# Patient Record
Sex: MALE | Race: BLACK OR AFRICAN AMERICAN | NOT HISPANIC OR LATINO
[De-identification: names, ages, dates, MRNs, and addresses within clinical notes are randomized per-mention and may not be internally consistent; named-entity substitution may affect disease eponyms.]

---

## 2018-06-19 PROBLEM — Z00.00 ENCOUNTER FOR PREVENTIVE HEALTH EXAMINATION: Status: ACTIVE | Noted: 2018-06-19

## 2018-08-25 ENCOUNTER — APPOINTMENT (OUTPATIENT)
Dept: SLEEP CENTER | Facility: CLINIC | Age: 58
End: 2018-08-25
Payer: MEDICARE

## 2018-08-25 ENCOUNTER — OUTPATIENT (OUTPATIENT)
Dept: OUTPATIENT SERVICES | Facility: HOSPITAL | Age: 58
LOS: 1 days | End: 2018-08-25
Payer: COMMERCIAL

## 2018-08-25 PROCEDURE — 95810 POLYSOM 6/> YRS 4/> PARAM: CPT | Mod: 26

## 2018-08-25 PROCEDURE — 95810 POLYSOM 6/> YRS 4/> PARAM: CPT

## 2018-08-27 DIAGNOSIS — G47.33 OBSTRUCTIVE SLEEP APNEA (ADULT) (PEDIATRIC): ICD-10-CM

## 2018-12-03 ENCOUNTER — APPOINTMENT (OUTPATIENT)
Dept: PULMONOLOGY | Facility: CLINIC | Age: 58
End: 2018-12-03
Payer: MEDICARE

## 2018-12-03 VITALS
SYSTOLIC BLOOD PRESSURE: 126 MMHG | HEIGHT: 72 IN | DIASTOLIC BLOOD PRESSURE: 81 MMHG | HEART RATE: 83 BPM | BODY MASS INDEX: 28.17 KG/M2 | TEMPERATURE: 98.1 F | RESPIRATION RATE: 16 BRPM | WEIGHT: 208 LBS | OXYGEN SATURATION: 96 %

## 2018-12-03 DIAGNOSIS — Z78.9 OTHER SPECIFIED HEALTH STATUS: ICD-10-CM

## 2018-12-03 PROCEDURE — ZZZZZ: CPT

## 2018-12-03 PROCEDURE — 99205 OFFICE O/P NEW HI 60 MIN: CPT | Mod: GC,25

## 2018-12-03 PROCEDURE — 94010 BREATHING CAPACITY TEST: CPT | Mod: GC

## 2018-12-03 PROCEDURE — 94729 DIFFUSING CAPACITY: CPT | Mod: GC

## 2018-12-03 PROCEDURE — 94726 PLETHYSMOGRAPHY LUNG VOLUMES: CPT | Mod: GC

## 2019-10-13 ENCOUNTER — EMERGENCY (EMERGENCY)
Facility: HOSPITAL | Age: 59
LOS: 1 days | Discharge: HOME | End: 2019-10-13
Attending: EMERGENCY MEDICINE
Payer: COMMERCIAL

## 2019-10-13 VITALS
OXYGEN SATURATION: 97 % | DIASTOLIC BLOOD PRESSURE: 78 MMHG | HEIGHT: 72 IN | SYSTOLIC BLOOD PRESSURE: 124 MMHG | WEIGHT: 205.03 LBS | HEART RATE: 90 BPM | TEMPERATURE: 99 F | RESPIRATION RATE: 18 BRPM

## 2019-10-13 VITALS
SYSTOLIC BLOOD PRESSURE: 121 MMHG | DIASTOLIC BLOOD PRESSURE: 85 MMHG | RESPIRATION RATE: 18 BRPM | OXYGEN SATURATION: 97 % | TEMPERATURE: 98 F | HEART RATE: 76 BPM

## 2019-10-13 PROCEDURE — 99283 EMERGENCY DEPT VISIT LOW MDM: CPT

## 2019-10-13 RX ORDER — IBUPROFEN 200 MG
600 TABLET ORAL ONCE
Refills: 0 | Status: COMPLETED | OUTPATIENT
Start: 2019-10-13 | End: 2019-10-13

## 2019-10-13 RX ORDER — LIDOCAINE 4 G/100G
1 CREAM TOPICAL ONCE
Refills: 0 | Status: COMPLETED | OUTPATIENT
Start: 2019-10-13 | End: 2019-10-13

## 2019-10-13 RX ORDER — ACETAMINOPHEN 500 MG
975 TABLET ORAL ONCE
Refills: 0 | Status: COMPLETED | OUTPATIENT
Start: 2019-10-13 | End: 2019-10-13

## 2019-10-13 RX ADMIN — Medication 600 MILLIGRAM(S): at 17:01

## 2019-10-13 RX ADMIN — Medication 975 MILLIGRAM(S): at 17:01

## 2019-10-13 RX ADMIN — LIDOCAINE 1 PATCH: 4 CREAM TOPICAL at 17:02

## 2019-10-13 NOTE — ED PROVIDER NOTE - CLINICAL SUMMARY MEDICAL DECISION MAKING FREE TEXT BOX
5 days of lower back pain, non radiating with no red flags-  no recent injury/trauma.  hx of herniated disc- motrin , tylenol and lidoderm given

## 2019-10-13 NOTE — ED ADULT NURSE NOTE - OBJECTIVE STATEMENT
Patient is a 59 y male presenting to the ED ambulatory from home with c/o back pain. Patient A&Ox4. Patient reports having multiple herniated disks in the back and reports having chronic pain. States the pain worsened on Tuesday in the medial lower back and has not improved since. Patient reports not having pain while lying down and states the pain increases when standing up or ambulating. Denies any numbness/tingling in extremities. Patient reports ambulating into ED with cane. Denies chest pain, SOB, headache, N/V/D, abdominal pain, fever/chills, burning upon urination or difficulty urinating, hematuria. PMH of depression.

## 2019-10-13 NOTE — ED PROVIDER NOTE - NSFOLLOWUPINSTRUCTIONS_ED_ALL_ED_FT
-- Please use 650mg Tylenol (also called acetaminophen) every 4 hours & 600mg Motrin (also called Advil or ibuprofen) every 6 hours as needed for pain/discomfort/swelling. You can get these without a prescription. Don't use more than 3500mg of Tylenol in any 24-hour period. Make sure your other prescription/over-the-counter medications don't contain any Tylenol so you don't take too much. If you have any stomach discomfort while taking Motrin, you can use TUMS or Pepcid or Zantac (these can all be bought without a prescription).   use over the counter lidoderm patch to lower back  once daily- SALONPAS is over the counter. -- Please use 650mg Tylenol (also called acetaminophen) every 4 hours & 600mg Motrin (also called Advil or ibuprofen) every 6 hours as needed for pain/discomfort/swelling. You can get these without a prescription. Don't use more than 3500mg of Tylenol in any 24-hour period. Make sure your other prescription/over-the-counter medications don't contain any Tylenol so you don't take too much. If you have any stomach discomfort while taking Motrin, you can use TUMS or Pepcid or Zantac (these can all be bought without a prescription).   use over the counter lidoderm patch to lower back  once daily- SALONPAS is over the counter.  Follow up with DR. COURTNEY [phone] in the next week  Ice packs to all sore areas for 20 min at a time -- Please use 650mg Tylenol (also called acetaminophen) every 4 hours & 600mg Motrin (also called Advil or ibuprofen) every 6 hours as needed for pain/discomfort/swelling. You can get these without a prescription. Don't use more than 3500mg of Tylenol in any 24-hour period. Make sure your other prescription/over-the-counter medications don't contain any Tylenol so you don't take too much. If you have any stomach discomfort while taking Motrin, you can use TUMS or Pepcid or Zantac (these can all be bought without a prescription).   use over the counter lidoderm patch to lower back  once daily- SALONPAS is over the counter.  Follow up with DR. COURTNEY [phone] in the next week  Ice packs to all sore areas for 20 min at a time    Opthalmology clinic  is [phone]

## 2019-10-13 NOTE — ED PROVIDER NOTE - PATIENT PORTAL LINK FT
You can access the FollowMyHealth Patient Portal offered by [facility] by registering at the following website: [email or web address]. By joining [facility]’s FollowMyHealth portal, you will also be able to view your health information using other applications (apps) compatible with our system.

## 2019-10-13 NOTE — ED PROVIDER NOTE - OBJECTIVE STATEMENT
58 yo male in room 6 Presents to the ER for evaluation of lower back pain since tuesday 5 days ago. Pt states "I started having pain on tuesday and I have a history of herniated disc in my lower spine and every few months it bothers me. I did not have any medications at home to take so I have not had anything for the pain". Pt denies recent injury  or trauma and uses a cane to get around for long walks.  Pt denies numbness/tingling/weakness at this time.  Denies loss of bowel or bladder function. Denies weakness. Denies sciatic pain.  No complaints of urinary symptoms.

## 2019-10-13 NOTE — ED PROVIDER NOTE - CARE PLAN
Principal Discharge DX:	Acute pain of left shoulder Principal Discharge DX:	Chronic midline low back pain without sciatica

## 2019-10-13 NOTE — ED PROVIDER NOTE - ATTENDING CONTRIBUTION TO CARE
60yo male pmh sarcoid, herniated discs, right hip replacement p/w bilateral lower back pain x 5 days. No meds at home. Gradual onset, worse with movement, improved with warm bath, had sudden recurrence 4d ago, now improving, able to ambulate. Denies urinary incontinence, saddle anesthesia, IVDU, fevers, n/v/d, abd pain, dysuria, hematuria, chest pain, cough, dyspnea, recent travel, LE swelling or pain. Negative straight leg raise. No spinal bony tenderness, pt appears well overall. Discussed OTC meds and lido patch, pt agrees with plan and outpatient follow up. Also provided contact info for ophtho clinic for follow up for his ophthalmic sarcoid as pt recently moved. Denies any new vision complaints.

## 2019-10-13 NOTE — ED ADULT TRIAGE NOTE - CHIEF COMPLAINT QUOTE
chronic back pain, history of herniated disc, ambulates with cane.  Pt reports he walked to the ER with cane

## 2020-02-10 ENCOUNTER — APPOINTMENT (OUTPATIENT)
Dept: PULMONOLOGY | Facility: CLINIC | Age: 60
End: 2020-02-10
Payer: MEDICARE

## 2020-02-10 VITALS
HEIGHT: 72 IN | TEMPERATURE: 98.8 F | RESPIRATION RATE: 16 BRPM | BODY MASS INDEX: 28.44 KG/M2 | SYSTOLIC BLOOD PRESSURE: 124 MMHG | HEART RATE: 70 BPM | WEIGHT: 210 LBS | DIASTOLIC BLOOD PRESSURE: 84 MMHG

## 2020-02-10 PROCEDURE — 99214 OFFICE O/P EST MOD 30 MIN: CPT

## 2020-02-10 NOTE — HISTORY OF PRESENT ILLNESS
[FreeTextEntry1] : 60yo M with moderate GARRETT, pulmonary sarcoidosis in remission, here for follow up of sleep apnea given that he needs hip surgery. He has hip arthritis given long term past usage of steroids. He snores, and is sometimes sleepy during the day. He sometimes has sleep fragmentation. He was on CPAP since 2007, but machine is nonfunctional now.

## 2020-02-10 NOTE — REVIEW OF SYSTEMS
[EDS: ESS=____] : daytime somnolence: ESS=[unfilled] [Fatigue] : fatigue [Snoring] : snoring [Witnessed Apneas] : witnessed apnea [A.M. Dry Mouth] : a.m. dry mouth [Arthralgias] : arthralgias [Negative] : Psychiatric [Obesity] : not obese [Chest Pain] : no chest pain [Diabetes] : no diabetes  [Anemia] : no anemia [A.M. Headache] : no headache present upon awakening [Difficulty Initiating Sleep] : no difficulty falling asleep [Lower Extremity Discomfort] : no lower extremity discomfort [Late day/ Evening symptoms] : no late day/evening symptoms [Cataplexy] :  no cataplexy

## 2020-02-10 NOTE — PHYSICAL EXAM
[General Appearance - In No Acute Distress] : no acute distress [Normal Conjunctiva] : the conjunctiva exhibited no abnormalities [III] : III [Neck Appearance] : the appearance of the neck was normal [Heart Rate And Rhythm] : heart rate was normal and rhythm regular [] : no respiratory distress [Heart Sounds] : normal S1 and S2 [Exaggerated Use Of Accessory Muscles For Inspiration] : no accessory muscle use [Respiration, Rhythm And Depth] : normal respiratory rhythm and effort [Auscultation Breath Sounds / Voice Sounds] : lungs were clear to auscultation bilaterally [Involuntary Movements] : no involuntary movements were seen [Non-Pitting] : non-pitting [Skin Color & Pigmentation] : normal skin color and pigmentation [No Focal Deficits] : no focal deficits [Oriented To Time, Place, And Person] : oriented to person, place, and time [Normal Oropharynx] : abnormal oropharynx

## 2020-02-10 NOTE — ASSESSMENT
[FreeTextEntry1] : 60yo M with moderate GARRETT, pulmonary sarcoidosis in remission, here for follow up of sleep apnea given that he needs hip surgery. He has hip arthritis given long term past usage of steroids. He snores, and is sometimes sleepy during the day. He sometimes has sleep fragmentation. He was on CPAP since 2007, but machine is nonfunctional now. \par \par

## 2020-02-12 ENCOUNTER — FORM ENCOUNTER (OUTPATIENT)
Age: 60
End: 2020-02-12

## 2020-02-19 ENCOUNTER — APPOINTMENT (OUTPATIENT)
Dept: PULMONOLOGY | Facility: CLINIC | Age: 60
End: 2020-02-19

## 2020-11-11 ENCOUNTER — APPOINTMENT (OUTPATIENT)
Dept: PULMONOLOGY | Facility: CLINIC | Age: 60
End: 2020-11-11

## 2021-03-08 ENCOUNTER — LABORATORY RESULT (OUTPATIENT)
Age: 61
End: 2021-03-08

## 2021-03-11 ENCOUNTER — APPOINTMENT (OUTPATIENT)
Dept: NEPHROLOGY | Facility: CLINIC | Age: 61
End: 2021-03-11
Payer: MEDICARE

## 2021-03-11 VITALS
OXYGEN SATURATION: 97 % | DIASTOLIC BLOOD PRESSURE: 75 MMHG | HEIGHT: 72 IN | BODY MASS INDEX: 27.47 KG/M2 | HEART RATE: 89 BPM | TEMPERATURE: 97.3 F | WEIGHT: 202.82 LBS | SYSTOLIC BLOOD PRESSURE: 116 MMHG

## 2021-03-11 DIAGNOSIS — E55.9 VITAMIN D DEFICIENCY, UNSPECIFIED: ICD-10-CM

## 2021-03-11 DIAGNOSIS — Z56.0 UNEMPLOYMENT, UNSPECIFIED: ICD-10-CM

## 2021-03-11 DIAGNOSIS — Z82.49 FAMILY HISTORY OF ISCHEMIC HEART DISEASE AND OTHER DISEASES OF THE CIRCULATORY SYSTEM: ICD-10-CM

## 2021-03-11 DIAGNOSIS — Z78.9 OTHER SPECIFIED HEALTH STATUS: ICD-10-CM

## 2021-03-11 DIAGNOSIS — Z86.74 PERSONAL HISTORY OF SUDDEN CARDIAC ARREST: ICD-10-CM

## 2021-03-11 DIAGNOSIS — Z83.2 FAMILY HISTORY OF DISEASES OF THE BLOOD AND BLOOD-FORMING ORGANS AND CERTAIN DISORDERS INVOLVING THE IMMUNE MECHANISM: ICD-10-CM

## 2021-03-11 DIAGNOSIS — Z83.3 FAMILY HISTORY OF DIABETES MELLITUS: ICD-10-CM

## 2021-03-11 DIAGNOSIS — E61.1 IRON DEFICIENCY: ICD-10-CM

## 2021-03-11 PROCEDURE — 99072 ADDL SUPL MATRL&STAF TM PHE: CPT

## 2021-03-11 PROCEDURE — 99214 OFFICE O/P EST MOD 30 MIN: CPT

## 2021-03-11 SDOH — ECONOMIC STABILITY - INCOME SECURITY: UNEMPLOYMENT, UNSPECIFIED: Z56.0

## 2021-03-11 NOTE — PHYSICAL EXAM
[General Appearance - Alert] : alert [General Appearance - In No Acute Distress] : in no acute distress [Respiration, Rhythm And Depth] : normal respiratory rhythm and effort [Exaggerated Use Of Accessory Muscles For Inspiration] : no accessory muscle use [Auscultation Breath Sounds / Voice Sounds] : lungs were clear to auscultation bilaterally [Apical Impulse] : the apical impulse was normal [Heart Sounds] : normal S1 and S2 [Murmurs] : no murmurs [Heart Sounds Pericardial Friction Rub] : no pericardial rub [Edema] : there was no peripheral edema [Bowel Sounds] : normal bowel sounds [Abdomen Soft] : soft [Abdomen Tenderness] : non-tender [] : no hepato-splenomegaly [No CVA Tenderness] : no ~M costovertebral angle tenderness [Dialysis Catheter] : dialysis catheter [Oriented To Time, Place, And Person] : oriented to person, place, and time [Impaired Insight] : insight and judgment were intact [Affect] : the affect was normal

## 2021-03-12 PROBLEM — E61.1 IRON DEFICIENCY: Status: ACTIVE | Noted: 2021-03-12

## 2021-03-12 PROBLEM — E55.9 VITAMIN D INSUFFICIENCY: Status: ACTIVE | Noted: 2021-03-12

## 2021-03-12 LAB
25(OH)D3 SERPL-MCNC: 21.5 NG/ML
ALBUMIN SERPL ELPH-MCNC: 3.6 G/DL
ALBUMIN SERPL ELPH-MCNC: 3.9 G/DL
ALBUMIN SERPL ELPH-MCNC: 4 G/DL
ALP BLD-CCNC: 122 U/L
ALP BLD-CCNC: 128 U/L
ALT SERPL-CCNC: 17 U/L
ALT SERPL-CCNC: 19 U/L
ANION GAP SERPL CALC-SCNC: 10 MMOL/L
ANION GAP SERPL CALC-SCNC: 10 MMOL/L
ANION GAP SERPL CALC-SCNC: 15 MMOL/L
APPEARANCE: CLEAR
AST SERPL-CCNC: 21 U/L
AST SERPL-CCNC: 23 U/L
BACTERIA: NEGATIVE
BASOPHILS # BLD AUTO: 0.05 K/UL
BASOPHILS NFR BLD AUTO: 0.5 %
BILIRUB SERPL-MCNC: 1.5 MG/DL
BILIRUB SERPL-MCNC: 1.6 MG/DL
BILIRUBIN URINE: NEGATIVE
BLOOD URINE: NEGATIVE
BSA DERIVED: 1.73 M2
BUN SERPL-MCNC: 21 MG/DL
BUN SERPL-MCNC: 27 MG/DL
BUN SERPL-MCNC: 29 MG/DL
CALCIUM SERPL-MCNC: 8.6 MG/DL
CALCIUM SERPL-MCNC: 9.3 MG/DL
CALCIUM SERPL-MCNC: 9.4 MG/DL
CALCIUM SERPL-MCNC: 9.4 MG/DL
CHLORIDE SERPL-SCNC: 107 MMOL/L
CHLORIDE SERPL-SCNC: 107 MMOL/L
CHLORIDE SERPL-SCNC: 108 MMOL/L
CO2 SERPL-SCNC: 20 MMOL/L
CO2 SERPL-SCNC: 23 MMOL/L
CO2 SERPL-SCNC: 23 MMOL/L
COLOR: YELLOW
CREAT 24H UR-MCNC: 1.3 G/24 H
CREAT 24H UR-MCNC: 1.3 G/24 H
CREAT 24H UR-MCNC: 2 G/24 H
CREAT 24H UR-MCNC: 2.1 G/24 H
CREAT 24H UR-MCNC: 2.1 G/24 H
CREAT ?TM UR-MCNC: 81 MG/DL
CREAT ?TM UR-MCNC: 83 MG/DL
CREAT ?TM UR-MCNC: 83 MG/DL
CREAT ?TM UR-MCNC: 92 MG/DL
CREAT ?TM UR-MCNC: 92 MG/DL
CREAT CL 24H UR+SERPL-VRATE: 59 ML/MIN
CREAT SERPL-MCNC: 1.76 MG/DL
CREAT SERPL-MCNC: 2.11 MG/DL
CREAT SERPL-MCNC: 2.35 MG/DL
CREAT SPEC-SCNC: 100 MG/DL
CREAT/PROT UR: 0.2 RATIO
EOSINOPHIL # BLD AUTO: 0.19 K/UL
EOSINOPHIL NFR BLD AUTO: 2.1 %
FERRITIN SERPL-MCNC: 300 NG/ML
GLUCOSE QUALITATIVE U: NEGATIVE
GLUCOSE SERPL-MCNC: 100 MG/DL
GLUCOSE SERPL-MCNC: 83 MG/DL
GLUCOSE SERPL-MCNC: 94 MG/DL
HCT VFR BLD CALC: 32.6 %
HGB BLD-MCNC: 10.9 G/DL
HYALINE CASTS: 0 /LPF
IMM GRANULOCYTES NFR BLD AUTO: 0.2 %
IRON SATN MFR SERPL: 14 %
IRON SERPL-MCNC: 35 UG/DL
KETONES URINE: NEGATIVE
LEUKOCYTE ESTERASE URINE: ABNORMAL
LYMPHOCYTES # BLD AUTO: 3.67 K/UL
LYMPHOCYTES NFR BLD AUTO: 40.1 %
MAGNESIUM SERPL-MCNC: 1.9 MG/DL
MAN DIFF?: NORMAL
MCHC RBC-ENTMCNC: 28.3 PG
MCHC RBC-ENTMCNC: 33.4 GM/DL
MCV RBC AUTO: 84.7 FL
MICROSCOPIC-UA: NORMAL
MONOCYTES # BLD AUTO: 1.03 K/UL
MONOCYTES NFR BLD AUTO: 11.3 %
NEUTROPHILS # BLD AUTO: 4.19 K/UL
NEUTROPHILS NFR BLD AUTO: 45.8 %
NITRITE URINE: NEGATIVE
NT-PROBNP SERPL-MCNC: ABNORMAL PG/ML
PARATHYROID HORMONE INTACT: 75 PG/ML
PH URINE: 6.5
PHOSPHATE SERPL-MCNC: 3.7 MG/DL
PLATELET # BLD AUTO: 277 K/UL
POTASSIUM SERPL-SCNC: 3.3 MMOL/L
POTASSIUM SERPL-SCNC: 4.2 MMOL/L
POTASSIUM SERPL-SCNC: 4.6 MMOL/L
PROT 24H UR-MRATE: 14 MG/DL
PROT 24H UR-MRATE: 21 MG/DL
PROT ?TM UR-MCNC: 24 HR
PROT SERPL-MCNC: 7.4 G/DL
PROT SERPL-MCNC: 7.6 G/DL
PROT UR-MCNC: 20 MG/DL
PROT UR-MCNC: 294 MG/24 H
PROT UR-MCNC: 346 MG/24 H
PROTEIN URINE: NORMAL
RBC # BLD: 3.85 M/UL
RBC # FLD: 16.8 %
RED BLOOD CELLS URINE: 2 /HPF
SODIUM SERPL-SCNC: 138 MMOL/L
SODIUM SERPL-SCNC: 140 MMOL/L
SODIUM SERPL-SCNC: 146 MMOL/L
SPECIFIC GRAVITY URINE: 1.01
SPECIMEN VOL 24H UR: 1400 ML
SPECIMEN VOL 24H UR: 1400 ML
SPECIMEN VOL 24H UR: 2475 ML
SPECIMEN VOL 24H UR: 2475 ML
SQUAMOUS EPITHELIAL CELLS: 15 /HPF
TIBC SERPL-MCNC: 258 UG/DL
UIBC SERPL-MCNC: 223 UG/DL
URINE COMMENTS: NORMAL
UROBILINOGEN URINE: ABNORMAL
WBC # FLD AUTO: 9.15 K/UL
WHITE BLOOD CELLS URINE: 10 /HPF

## 2021-03-12 RX ORDER — SERTRALINE 25 MG/1
25 TABLET, FILM COATED ORAL DAILY
Refills: 0 | Status: DISCONTINUED | COMMUNITY
Start: 2018-12-03 | End: 2021-03-12

## 2021-03-12 NOTE — REVIEW OF SYSTEMS
[Difficulty Walking] : difficulty walking [Negative] : Integumentary [Fever] : no fever [Chills] : no chills [Anxiety] : no anxiety [Depression] : no depression [FreeTextEntry6] : +demetrius velozeping

## 2021-03-12 NOTE — ASSESSMENT
[FreeTextEntry1] : Mr Llanes is a 61 y/o male with H/O sarcoidosis, CHF, cardiomyopathy with reduced EF, PE on coumadin, New A Fib here for evaluation of DOUG: \par \par DOUG: Likely ATN in the setting of cardiac arrest/shock\par HD dependent for  close to 2 months\par Appears to have resolving ATN\par 24 hour urine with recovery\par Last HD was 1 week back\par Check labs again  in 1 week\par Maintain on losartan\par PC dressing changed\par \par \par Volume/BP: stable and appears euvolemic on exam \par Given the low EF, inclined to add a diuretic\par Will also d/w heart failure\par \par If labs remain stable  will d/c catheter \par F/U in early April \par Total time spent 30 min in evaluating, coordinating care and treating the patient  \par \par ADDENDUM:\par Creatinine continues to improve\par Noted to be vitamin D deficient: start Vit D supplements\par Also start low dose iron PO\par Start torsemide 20 mg Daily\par Will try to get the catheter removed next week\par D/W Princess ( NP heart failure: [phone]): she agrees with the diuretic. She will see him week of march 22. He will get a repeat echo in april\par Will email Dr Swift to get the cath removed\par \par Vic[place]: [phone]\par \par PCP: Dr. Bert Rodríguez : [phone]

## 2021-03-12 NOTE — HISTORY OF PRESENT ILLNESS
[FreeTextEntry1] : Mr. Llanes is a 61 y/o male with H/O Sarcoidosis, GARRETT, PE,  CHF with reduced EF  with life vest here for evaluation and management of DOUG. \par \par He was admitted to OSH in January with SOB. Hospital  course complicated by cardiac arrest and Multi organ failure.Assumed to have DOUG due to shock, Started on  HD and dc home on dialysis.  Had been tolerating HD well. Noted to have low pre HD creatinine. 24 hour urine collection with renal recovery. \par Admitted to OSH last week with hypotension. ?Rapid afib. Underwent HD with no issues . DC home on losartan and digoxin. Has been feeling well. Repeat 24 hour urine  with over 2 liters and clearance in the 40's. \par  \par 2D echo in Jan: : EF: 20-25%, Severly decreased LV systolic function, impaired relaxation of diastolic filling\par severely increase LV cavity size, Mod reduced RV systolic function, severely dilated  Rt and lt atrium\par mild MR/Mod TR, Mild elevated PA systolic pressure\par \par Feels great, appetite is better, energy is good. Breathing is good. CPAP machine at home is  not working\par \par \par

## 2021-03-18 LAB
ALBUMIN SERPL ELPH-MCNC: 3.9 G/DL
ANION GAP SERPL CALC-SCNC: 12 MMOL/L
BASOPHILS # BLD AUTO: 0.05 K/UL
BASOPHILS NFR BLD AUTO: 0.5 %
BUN SERPL-MCNC: 36 MG/DL
CALCIUM SERPL-MCNC: 9.7 MG/DL
CHLORIDE SERPL-SCNC: 107 MMOL/L
CO2 SERPL-SCNC: 21 MMOL/L
CREAT SERPL-MCNC: 2.01 MG/DL
EOSINOPHIL # BLD AUTO: 0.27 K/UL
EOSINOPHIL NFR BLD AUTO: 2.6 %
GLUCOSE SERPL-MCNC: 108 MG/DL
HCT VFR BLD CALC: 37.3 %
HGB BLD-MCNC: 12.2 G/DL
IMM GRANULOCYTES NFR BLD AUTO: 0.5 %
LYMPHOCYTES # BLD AUTO: 3.81 K/UL
LYMPHOCYTES NFR BLD AUTO: 37.4 %
MAN DIFF?: NORMAL
MCHC RBC-ENTMCNC: 28.2 PG
MCHC RBC-ENTMCNC: 32.7 GM/DL
MCV RBC AUTO: 86.1 FL
MONOCYTES # BLD AUTO: 1.24 K/UL
MONOCYTES NFR BLD AUTO: 12.2 %
NEUTROPHILS # BLD AUTO: 4.78 K/UL
NEUTROPHILS NFR BLD AUTO: 46.8 %
PHOSPHATE SERPL-MCNC: 4.5 MG/DL
PLATELET # BLD AUTO: 324 K/UL
POTASSIUM SERPL-SCNC: 4.9 MMOL/L
RBC # BLD: 4.33 M/UL
RBC # FLD: 16.6 %
SODIUM SERPL-SCNC: 139 MMOL/L
WBC # FLD AUTO: 10.2 K/UL

## 2021-03-19 ENCOUNTER — APPOINTMENT (OUTPATIENT)
Dept: ENDOVASCULAR SURGERY | Facility: CLINIC | Age: 61
End: 2021-03-19
Payer: MEDICARE

## 2021-03-19 LAB
APPEARANCE: CLEAR
BACTERIA: ABNORMAL
BILIRUBIN URINE: NEGATIVE
BLOOD URINE: NEGATIVE
COLOR: YELLOW
CREAT SPEC-SCNC: 231 MG/DL
CREAT/PROT UR: 0.2 RATIO
GLUCOSE QUALITATIVE U: NEGATIVE
HYALINE CASTS: 1 /LPF
KETONES URINE: NORMAL
LEUKOCYTE ESTERASE URINE: ABNORMAL
MICROSCOPIC-UA: NORMAL
NITRITE URINE: NEGATIVE
PH URINE: 6
PROT UR-MCNC: 48 MG/DL
PROTEIN URINE: ABNORMAL
RED BLOOD CELLS URINE: 2 /HPF
SPECIFIC GRAVITY URINE: 1.01
SQUAMOUS EPITHELIAL CELLS: 5 /HPF
UROBILINOGEN URINE: NORMAL
WHITE BLOOD CELLS URINE: 120 /HPF

## 2021-03-19 PROCEDURE — 99072 ADDL SUPL MATRL&STAF TM PHE: CPT

## 2021-03-19 PROCEDURE — 36589 REMOVAL TUNNELED CV CATH: CPT

## 2021-04-07 ENCOUNTER — APPOINTMENT (OUTPATIENT)
Dept: NEPHROLOGY | Facility: CLINIC | Age: 61
End: 2021-04-07

## 2021-05-05 ENCOUNTER — APPOINTMENT (OUTPATIENT)
Dept: NEPHROLOGY | Facility: CLINIC | Age: 61
End: 2021-05-05
Payer: MEDICARE

## 2021-05-05 PROCEDURE — 99442: CPT

## 2021-06-08 RX ORDER — TORSEMIDE 20 MG/1
20 TABLET ORAL DAILY
Qty: 30 | Refills: 3 | Status: DISCONTINUED | COMMUNITY
Start: 2021-03-12 | End: 2021-06-08

## 2021-06-14 ENCOUNTER — APPOINTMENT (OUTPATIENT)
Dept: ELECTROPHYSIOLOGY | Facility: CLINIC | Age: 61
End: 2021-06-14
Payer: MEDICARE

## 2021-06-14 VITALS
TEMPERATURE: 96.7 F | SYSTOLIC BLOOD PRESSURE: 111 MMHG | BODY MASS INDEX: 32.34 KG/M2 | HEART RATE: 90 BPM | WEIGHT: 244 LBS | DIASTOLIC BLOOD PRESSURE: 77 MMHG | OXYGEN SATURATION: 97 % | HEIGHT: 73 IN

## 2021-06-14 PROCEDURE — 93000 ELECTROCARDIOGRAM COMPLETE: CPT

## 2021-06-14 PROCEDURE — 99205 OFFICE O/P NEW HI 60 MIN: CPT

## 2021-06-14 RX ORDER — LOSARTAN POTASSIUM 25 MG/1
25 TABLET, FILM COATED ORAL DAILY
Qty: 30 | Refills: 2 | Status: DISCONTINUED | COMMUNITY
Start: 2021-03-11 | End: 2021-06-14

## 2021-06-14 NOTE — DISCUSSION/SUMMARY
[FreeTextEntry1] : Impression:\par \par 1. Chronic systolic CHF: EKG performed today to assess for presence of conduction disease and reveals NSR. Hospitalized in 12/2020 for acute CHF exacerbation with LVEF noted to be 30%. Now on OMT and LifeVest in place. Repeat ECHO with severe LV dysfunction despite OMT. Given NYHA Class II symptoms and persistent severe LV dysfunction despite > 3 mo of OMT, recommend placement of dual chamber ICD for primary prevention of SCD. A thorough discussion was had with the patient concerning all aspects of ICD therapy. We reviewed the data supporting ICD therapy and how it applies individually.  We discussed the procedures, risks and outcomes of ICD implantation an living with an ICD. We discussed management of ICD therapy throughout life, including deactivation of the ICD. After all questions were answered, and literature was provided, it was a shared decision to proceed with ICD therapy. Hold diabetes medication and blood thinners the morning of the procedure, if applicable. May take all other medication with a small sip of water. \par \par 2. HTN: resume oral antihypertensives as prescribed. Encouraged heart healthy diet, sodium restriction, and weight loss. Continue regular f/u with Cardiologist for further HTN management.\par \par 3. HLD: resume statin therapy as prescribed and regular f/u with Cardiologist for routine lipid monitoring and management.\par \par Plan for ICD placement.

## 2021-06-14 NOTE — PHYSICAL EXAM
[Well Developed] : well developed [Well Nourished] : well nourished [No Acute Distress] : no acute distress [Normal Conjunctiva] : normal conjunctiva [Normal Venous Pressure] : normal venous pressure [No Carotid Bruit] : no carotid bruit [Normal S1, S2] : normal S1, S2 [No Murmur] : no murmur [No Rub] : no rub [No Gallop] : no gallop [Good Air Entry] : good air entry [Clear Lung Fields] : clear lung fields [No Respiratory Distress] : no respiratory distress  [Soft] : abdomen soft [Non Tender] : non-tender [No Masses/organomegaly] : no masses/organomegaly [Normal Bowel Sounds] : normal bowel sounds [Normal Gait] : normal gait [No Edema] : no edema [No Cyanosis] : no cyanosis [No Clubbing] : no clubbing [No Varicosities] : no varicosities [No Rash] : no rash [No Skin Lesions] : no skin lesions [Moves all extremities] : moves all extremities [No Focal Deficits] : no focal deficits [Normal Speech] : normal speech [Alert and Oriented] : alert and oriented [Normal memory] : normal memory

## 2021-06-14 NOTE — CARDIOLOGY SUMMARY
[facility]  [address]  [place] [address]               Laron Kothari Jr.   2017  8:41 AM   ED    Description:  Male : 1970   Department:  [facility]            Your Care was Coordinated By:     Provider Role From To    Stephen Mann NP Nurse Practitioner [phone] --      Reason for Visit     Cough           Diagnoses this Visit        Comments    Acute maxillary sinusitis, recurrence not specified    -  Primary       ED Disposition     None           To Do List           Follow-up Information     Follow up with Kristen Adler DO. Schedule an appointment as soon as possible for a visit in 2 days.    Specialty:  Internal Medicine    Contact information:    [address] [place] [address] [address] [place] [address]  [phone]        [facility]     [facility] Nurse Care Line -  Assistance  Unless otherwise directed by your provider, please contact [facility], our nurse care line that is available for  assistance.     Registered nurses in the [facility] provide: appointment scheduling, clinical advisement, health education, and other advisory services.  Call: [phone] (toll free)               Medications           Message regarding Medications     Verify the changes and/or additions to your medication regime listed below are the same as discussed with your clinician today.  If any of these changes or additions are incorrect, please notify your healthcare provider.             Verify that the below list of medications is an accurate representation of the medications you are currently taking.  If none reported, the list may be blank. If incorrect, please contact your healthcare provider. Carry this list with you in case of emergency.           Current Medications     blood sugar diagnostic Strp Once daily glucose testing.    ciclopirox (PENLAC) 8 % Soln Apply to affected toenails at night time DAILY. On  [de-identified] : 5/25/2021: LVEF 20-25%\par 1/16/2021: LVEF 20-25%\par 12/19/2020: LVEF 35% "7th day, file nails down, clean all nails with alcohol and restart application process.    gabapentin (NEURONTIN) 300 MG capsule Take 1 capsule (300 mg total) by mouth 2 (two) times daily.    glipiZIDE (GLUCOTROL) 5 MG tablet TAKE ONE TABLET BY MOUTH ONCE DAILY WITH BREAKFAST    indomethacin (INDOCIN) 50 MG capsule TAKE ONE CAPSULE BY MOUTH THREE TIMES DAILY    lancets (LANCETS,THIN) Misc Once daily glucose testing.    lisinopril (PRINIVIL,ZESTRIL) 40 MG tablet Take 1 tablet (40 mg total) by mouth once daily.    metformin (GLUCOPHAGE) 1000 MG tablet Take 1 tablet (1,000 mg total) by mouth 2 (two) times daily with meals.    pravastatin (PRAVACHOL) 80 MG tablet TAKE ONE TABLET BY MOUTH ONCE DAILY           Clinical Reference Information           Your Vitals Were     BP Pulse Temp Resp Height Weight    141/81 (BP Location: Right arm, Patient Position: Sitting) 112 99.2 °F (37.3 °C) (Oral) 18 6' 2" (1.88 m) 117.9 kg (260 lb)    SpO2 BMI             98% 33.38 kg/m2         Allergies as of 4/23/2017     No Known Allergies      Immunizations Administered on Date of Encounter - 4/23/2017     None      ED Micro, Lab, POCT     None      ED Imaging Orders     None        Discharge Instructions         Acute Sinusitis    Acute sinusitis is irritation and swelling of the sinuses. It is usually caused by a viral infection after a common cold. Your doctor can help you find relief.  What is acute sinusitis?  Sinuses are air-filled spaces in the skull behind the face. They are kept moist and clean by a lining of mucosa. Things such as pollen, smoke, and chemical fumes can irritate the mucosa. It can then swell up. As a response to irritation, the mucosa makes more mucus and other fluids. Tiny hairlike cilia cover the mucosa. Cilia help carry mucus toward the opening of the sinus. Too much mucus may cause the cilia to stop working. This blocks the sinus opening. A buildup of fluid in the sinuses then causes pain and pressure. It can " also encourage bacteria to grow in the sinuses.  Common symptoms of acute sinusitis  You may have:  · Facial soreness pain  · Headache  · Fever  · Fluid draining in the back of the throat (postnasal drip)  · Congestion  · Drainage that is thick and colored, instead of clear  · Cough  Diagnosing acute sinusitis  Your doctor will ask about your symptoms and health history. He or she will look at your ear, nose, and throat. You usually won't need to have X-rays taken.    The doctor may take a sample of mucus to check for bacteria. If you have sinusitis that keeps coming back, you may need imaging tests such as X-rays or CAT scans. This will help your doctor check for a structural problem that may be causing the infection.  Treating acute sinusitis  Treatment is aimed at unblocking the sinus opening and helping the cilia work again. You may need to take antihistamine and decongestant medicine. These can reduce inflammation and decrease the amount of fluid your sinuses make. If you have a bacterial infection, you will need to take antibiotic medicine for 10 to 14 days. Take this medicine until it is gone, even if you feel better.  Date Last Reviewed: 10/1/2016  © 4656-9472 [employer]. [address] [place] [address]. All rights reserved. This information is not intended as a substitute for professional medical care. Always follow your healthcare professional's instructions.          Your Scheduled Appointments     Jun 05, 2017  8:00 AM CDT   Fasting Lab with AMANDA, GISELLA TAI   [facility]    [address] [place] [id number]   [phone]            Jun 09, 2017  9:40 AM CDT   Established Patient Visit with DO [facility] ([facility])    [address] [place] [address]   [phone]            Jul 07, 2017  9:40 AM CDT   Established Patient Visit with Christine Arteaga DPM    [facility] ([facility]    [address] [id number] [phone]            Apr 05, 2018  3:00 PM CDT   Established Patient Visit with Pepe Hinson OD   [facility] ([facility]    [address] [address]   [phone]              Smoking Cessation     If you would like to quit smoking:   You may be eligible for free services if you are a [place] resident and started smoking cigarettes before September 1, 1988.  Call the Smoking Cessation Trust (SCT) toll free at (097) 613-8096 or (815) 251-6562.   Call 1-800-QUIT-NOW if you do not meet the above criteria.   Contact us via email: [email or web address]   View our website for more information: [email or web address]         [facility] complies with applicable Federal civil rights laws and does not discriminate on the basis of race, color, national origin, age, disability, or sex.        Language Assistance Services     ATTENTION: Language assistance services are available, free of charge. Please call [phone].      ATENCIÓN: Si habla español, tiene a aranda disposición servicios gratuitos de asistencia lingüística. Llame al [phone].     CHÚ Ý: N?u b?n nói Ti?ng Vi?t, có các d?ch v? h? tr? ngôn ng? mi?n phí dành cho b?n. G?i s? [phone].

## 2021-06-14 NOTE — HISTORY OF PRESENT ILLNESS
[FreeTextEntry1] : Pb Llanes is a 59y/o man with Hx of HTN, HLD, epilepsy, Pulmonary fibrosis (stage IV), sarcoidosis, followed by Rheumatology, ESRD on HF (T/TR/Sat) via R chest Permacath, hip surgery c/b PE and possible paroxysmal atrial flutter, on warfarin, GARRETT on CPAP, and chronic systolic CHF currently with LifeVest in place who presents today for initial evaluation. Hospitalized at [facility] in 12/2020 for acute on chronic CHF exacerbation. Discharged with LifeVest in place. Since that time, maintained on OMT. Feeling well. Does have SOB on exertion. Able to climb a flight of stairs. Denies chest pain, palpitations, SOB at rest, syncope or near syncope.\par

## 2021-06-20 ENCOUNTER — NON-APPOINTMENT (OUTPATIENT)
Age: 61
End: 2021-06-20

## 2021-07-07 ENCOUNTER — OUTPATIENT (OUTPATIENT)
Dept: OUTPATIENT SERVICES | Facility: HOSPITAL | Age: 61
LOS: 1 days | End: 2021-07-07

## 2021-07-07 VITALS
HEIGHT: 71 IN | OXYGEN SATURATION: 98 % | DIASTOLIC BLOOD PRESSURE: 78 MMHG | SYSTOLIC BLOOD PRESSURE: 108 MMHG | RESPIRATION RATE: 16 BRPM | HEART RATE: 81 BPM | WEIGHT: 250 LBS | TEMPERATURE: 98 F

## 2021-07-07 DIAGNOSIS — Z96.649 PRESENCE OF UNSPECIFIED ARTIFICIAL HIP JOINT: Chronic | ICD-10-CM

## 2021-07-07 DIAGNOSIS — I50.9 HEART FAILURE, UNSPECIFIED: ICD-10-CM

## 2021-07-07 DIAGNOSIS — Z95.828 PRESENCE OF OTHER VASCULAR IMPLANTS AND GRAFTS: Chronic | ICD-10-CM

## 2021-07-07 LAB
ALBUMIN SERPL ELPH-MCNC: 4.3 G/DL — SIGNIFICANT CHANGE UP (ref 3.3–5)
ALP SERPL-CCNC: 65 U/L — SIGNIFICANT CHANGE UP (ref 40–120)
ALT FLD-CCNC: 23 U/L — SIGNIFICANT CHANGE UP (ref 4–41)
ANION GAP SERPL CALC-SCNC: 15 MMOL/L — HIGH (ref 7–14)
APTT BLD: 31.4 SEC — SIGNIFICANT CHANGE UP (ref 27–36.3)
AST SERPL-CCNC: 16 U/L — SIGNIFICANT CHANGE UP (ref 4–40)
BILIRUB SERPL-MCNC: 0.3 MG/DL — SIGNIFICANT CHANGE UP (ref 0.2–1.2)
BUN SERPL-MCNC: 31 MG/DL — HIGH (ref 7–23)
CALCIUM SERPL-MCNC: 9 MG/DL — SIGNIFICANT CHANGE UP (ref 8.4–10.5)
CHLORIDE SERPL-SCNC: 109 MMOL/L — HIGH (ref 98–107)
CO2 SERPL-SCNC: 18 MMOL/L — LOW (ref 22–31)
CREAT SERPL-MCNC: 2.07 MG/DL — HIGH (ref 0.5–1.3)
GLUCOSE SERPL-MCNC: 101 MG/DL — HIGH (ref 70–99)
HCT VFR BLD CALC: 36.7 % — LOW (ref 39–50)
HGB BLD-MCNC: 12.4 G/DL — LOW (ref 13–17)
INR BLD: 1.13 RATIO — SIGNIFICANT CHANGE UP (ref 0.88–1.16)
MCHC RBC-ENTMCNC: 29.9 PG — SIGNIFICANT CHANGE UP (ref 27–34)
MCHC RBC-ENTMCNC: 33.8 GM/DL — SIGNIFICANT CHANGE UP (ref 32–36)
MCV RBC AUTO: 88.4 FL — SIGNIFICANT CHANGE UP (ref 80–100)
NRBC # BLD: 0 /100 WBCS — SIGNIFICANT CHANGE UP
NRBC # FLD: 0 K/UL — SIGNIFICANT CHANGE UP
PLATELET # BLD AUTO: 212 K/UL — SIGNIFICANT CHANGE UP (ref 150–400)
POTASSIUM SERPL-MCNC: 4.3 MMOL/L — SIGNIFICANT CHANGE UP (ref 3.5–5.3)
POTASSIUM SERPL-SCNC: 4.3 MMOL/L — SIGNIFICANT CHANGE UP (ref 3.5–5.3)
PROT SERPL-MCNC: 7.9 G/DL — SIGNIFICANT CHANGE UP (ref 6–8.3)
PROTHROM AB SERPL-ACNC: 12.9 SEC — SIGNIFICANT CHANGE UP (ref 10.6–13.6)
RBC # BLD: 4.15 M/UL — LOW (ref 4.2–5.8)
RBC # FLD: 16.7 % — HIGH (ref 10.3–14.5)
SODIUM SERPL-SCNC: 142 MMOL/L — SIGNIFICANT CHANGE UP (ref 135–145)
WBC # BLD: 7.67 K/UL — SIGNIFICANT CHANGE UP (ref 3.8–10.5)
WBC # FLD AUTO: 7.67 K/UL — SIGNIFICANT CHANGE UP (ref 3.8–10.5)

## 2021-07-07 NOTE — H&P PST ADULT - HISTORY OF PRESENT ILLNESS
60 year old male presents to presurgical testing with diagnosis of heart failure unspecified scheduled for ICD implant. Pt s/p left total hip replacement in 11/2020, complicated by PE and A fib and A flutter, on coumadin, and CHF exacerbation in 12/2020, on LiveVest, DOUG was on hemodialysis until 4/2021.

## 2021-07-07 NOTE — H&P PST ADULT - NEGATIVE ENMT SYMPTOMS
COVID swab sent   no hearing difficulty/no ear pain/no tinnitus/no vertigo/no sinus symptoms/no nose bleeds/no throat pain/no dysphagia

## 2021-07-07 NOTE — H&P PST ADULT - ASSESSMENT
Problem: heart failure unspecified   Assessment and Plan: Pt is tentatively scheduled for ICD implant for 7/21/21. Pre-op instructions provided as per Dr Spicer. As per surgeon's note, may hold coumadin on the morning of procedure but take all other regular medications with a sip of water. Pt has a scheduled preop COVID test. Pt given verbal and written instructions with teach back. Pt verbalized understanding with return demonstration.     Problem: GARRETT  Assessment and Plan: Not complaint with CPAP, OR booking notified.    Problem: PCN allergy  Assessment and Plan: OR booking notified.    Surgeon's visit note and ekg in chart.

## 2021-07-07 NOTE — H&P PST ADULT - NSICDXPASTSURGICALHX_GEN_ALL_CORE_FT
PAST SURGICAL HISTORY:  History of total hip replacement left 11/2020, and right 3/2010    S/P dialysis catheter insertion Right chest, removed early 3/2021

## 2021-07-07 NOTE — H&P PST ADULT - NSICDXPASTMEDICALHX_GEN_ALL_CORE_FT
PAST MEDICAL HISTORY:  DOUG (acute kidney injury) ESRD - last dialysis 4/2021    Anemia     Atrial fibrillation and flutter 12/2020    Depression     H/O CHF exacerbation 12/2020    History of epilepsy last seizure 2014    HLD (hyperlipidemia)     HTN (hypertension)     GARRETT on CPAP     Pulmonary embolism 12/2020    Sarcoidosis pulmonary dx age 14

## 2021-07-18 ENCOUNTER — APPOINTMENT (OUTPATIENT)
Dept: DISASTER EMERGENCY | Facility: CLINIC | Age: 61
End: 2021-07-18

## 2021-07-21 ENCOUNTER — INPATIENT (INPATIENT)
Facility: HOSPITAL | Age: 61
LOS: 0 days | Discharge: HOME | End: 2021-07-21
Attending: STUDENT IN AN ORGANIZED HEALTH CARE EDUCATION/TRAINING PROGRAM | Admitting: STUDENT IN AN ORGANIZED HEALTH CARE EDUCATION/TRAINING PROGRAM
Payer: MEDICARE

## 2021-07-21 DIAGNOSIS — Z96.649 PRESENCE OF UNSPECIFIED ARTIFICIAL HIP JOINT: Chronic | ICD-10-CM

## 2021-07-21 DIAGNOSIS — Z95.828 PRESENCE OF OTHER VASCULAR IMPLANTS AND GRAFTS: Chronic | ICD-10-CM

## 2021-07-21 DIAGNOSIS — I50.9 HEART FAILURE, UNSPECIFIED: ICD-10-CM

## 2021-07-21 LAB
ANION GAP SERPL CALC-SCNC: 14 MMOL/L — SIGNIFICANT CHANGE UP (ref 7–14)
BUN SERPL-MCNC: 27 MG/DL — HIGH (ref 7–23)
CALCIUM SERPL-MCNC: 9.5 MG/DL — SIGNIFICANT CHANGE UP (ref 8.4–10.5)
CHLORIDE SERPL-SCNC: 110 MMOL/L — HIGH (ref 98–107)
CO2 SERPL-SCNC: 17 MMOL/L — LOW (ref 22–31)
CREAT SERPL-MCNC: 1.72 MG/DL — HIGH (ref 0.5–1.3)
GLUCOSE SERPL-MCNC: 121 MG/DL — HIGH (ref 70–99)
INR BLD: 1.1 RATIO — SIGNIFICANT CHANGE UP (ref 0.88–1.16)
POTASSIUM SERPL-MCNC: 4.6 MMOL/L — SIGNIFICANT CHANGE UP (ref 3.5–5.3)
POTASSIUM SERPL-SCNC: 4.6 MMOL/L — SIGNIFICANT CHANGE UP (ref 3.5–5.3)
PROTHROM AB SERPL-ACNC: 12.6 SEC — SIGNIFICANT CHANGE UP (ref 10.6–13.6)
SODIUM SERPL-SCNC: 141 MMOL/L — SIGNIFICANT CHANGE UP (ref 135–145)

## 2021-07-21 PROCEDURE — 71045 X-RAY EXAM CHEST 1 VIEW: CPT | Mod: 26

## 2021-07-21 PROCEDURE — 33249 INSJ/RPLCMT DEFIB W/LEAD(S): CPT | Mod: 59

## 2021-07-21 PROCEDURE — 93010 ELECTROCARDIOGRAM REPORT: CPT

## 2021-07-21 RX ORDER — SERTRALINE 25 MG/1
1 TABLET, FILM COATED ORAL
Qty: 0 | Refills: 0 | DISCHARGE

## 2021-07-21 RX ORDER — FERROUS SULFATE 325(65) MG
1 TABLET ORAL
Qty: 0 | Refills: 0 | DISCHARGE

## 2021-07-21 RX ORDER — DIGOXIN 250 MCG
1 TABLET ORAL
Qty: 0 | Refills: 0 | DISCHARGE

## 2021-07-21 RX ORDER — SACUBITRIL AND VALSARTAN 24; 26 MG/1; MG/1
1 TABLET, FILM COATED ORAL
Qty: 0 | Refills: 0 | DISCHARGE

## 2021-07-21 RX ORDER — CHOLECALCIFEROL (VITAMIN D3) 125 MCG
1 CAPSULE ORAL
Qty: 0 | Refills: 0 | DISCHARGE

## 2021-07-21 RX ORDER — DOCUSATE SODIUM 100 MG
1 CAPSULE ORAL
Qty: 0 | Refills: 0 | DISCHARGE

## 2021-07-21 RX ORDER — VITAMIN E 100 UNIT
1 CAPSULE ORAL
Qty: 0 | Refills: 0 | DISCHARGE

## 2021-07-21 RX ORDER — SODIUM CHLORIDE 9 MG/ML
1000 INJECTION INTRAMUSCULAR; INTRAVENOUS; SUBCUTANEOUS
Refills: 0 | Status: DISCONTINUED | OUTPATIENT
Start: 2021-07-21 | End: 2021-07-21

## 2021-07-21 RX ORDER — CARVEDILOL PHOSPHATE 80 MG/1
1 CAPSULE, EXTENDED RELEASE ORAL
Qty: 0 | Refills: 0 | DISCHARGE

## 2021-07-21 RX ORDER — WARFARIN SODIUM 2.5 MG/1
1 TABLET ORAL
Qty: 0 | Refills: 0 | DISCHARGE

## 2021-07-21 NOTE — CHART NOTE - NSCHARTNOTEFT_GEN_A_CORE
Spoke with Dr. Spicer and recommended that [facility] and can restart his coumadin medication on 7/23/21.

## 2021-07-21 NOTE — CHART NOTE - NSCHARTNOTEFT_GEN_A_CORE
s/p ICD implantation today. Post implantation teaching with instructions provided.  Pressure dressing on for overnight bleeding precaution as patient is on Coumadin for AC.  Instructed to remove the pressure dressing on 7/23.  Follow up on 8/5/2021 10:00am.  CXR pending.

## 2021-07-21 NOTE — CHART NOTE - NSCHARTNOTEFT_GEN_A_CORE
In brief this is a 59 y/o M with PMH of HTN, Sarcoidosis(on Prednisone), was on ESRD(Tue/Thurs/Sat thru right chest wall permacath last HD was in April), Hip surgery in 2020 c/b by PE and Janeen(on Coumadin), HFrEF(on Lifevest) presented to [facility] for ICD implant. Patient stated that he has no complaints currently. Repeat TTE done recently despite OMT patient still with severe LV dysfunction. Medication list reviewed with patient and updated on [place]. Reviewed pre-surgical testing H&P and All scripts note by Dr. Spicer on 06/14/21. Explained about the procedure in detail to the patient and girlfriend at bedside. All risks and benefits of the procedure explained. Patient understood and signed the consents. Patient last took his Coumadin dose of 6mg on 7/19.     COVID PCR not detected on 07/19/21.

## 2021-08-05 ENCOUNTER — APPOINTMENT (OUTPATIENT)
Dept: ELECTROPHYSIOLOGY | Facility: CLINIC | Age: 61
End: 2021-08-05
Payer: MEDICARE

## 2021-08-05 PROBLEM — E78.5 HYPERLIPIDEMIA, UNSPECIFIED: Chronic | Status: ACTIVE | Noted: 2021-07-07

## 2021-08-05 PROBLEM — Z86.69 PERSONAL HISTORY OF OTHER DISEASES OF THE NERVOUS SYSTEM AND SENSE ORGANS: Chronic | Status: ACTIVE | Noted: 2021-07-07

## 2021-08-05 PROBLEM — D86.9 SARCOIDOSIS, UNSPECIFIED: Chronic | Status: ACTIVE | Noted: 2021-07-07

## 2021-08-05 PROBLEM — I26.99 OTHER PULMONARY EMBOLISM WITHOUT ACUTE COR PULMONALE: Chronic | Status: ACTIVE | Noted: 2021-07-07

## 2021-08-05 PROBLEM — I10 ESSENTIAL (PRIMARY) HYPERTENSION: Chronic | Status: ACTIVE | Noted: 2021-07-07

## 2021-08-05 PROBLEM — F32.9 MAJOR DEPRESSIVE DISORDER, SINGLE EPISODE, UNSPECIFIED: Chronic | Status: ACTIVE | Noted: 2021-07-07

## 2021-08-05 PROBLEM — N17.9 ACUTE KIDNEY FAILURE, UNSPECIFIED: Chronic | Status: ACTIVE | Noted: 2021-07-07

## 2021-08-05 PROBLEM — Z86.79 PERSONAL HISTORY OF OTHER DISEASES OF THE CIRCULATORY SYSTEM: Chronic | Status: ACTIVE | Noted: 2021-07-07

## 2021-08-05 PROBLEM — D64.9 ANEMIA, UNSPECIFIED: Chronic | Status: ACTIVE | Noted: 2021-07-07

## 2021-08-05 PROBLEM — I48.91 UNSPECIFIED ATRIAL FIBRILLATION: Chronic | Status: ACTIVE | Noted: 2021-07-07

## 2021-08-05 PROBLEM — G47.33 OBSTRUCTIVE SLEEP APNEA (ADULT) (PEDIATRIC): Chronic | Status: ACTIVE | Noted: 2021-07-07

## 2021-08-05 PROCEDURE — 99024 POSTOP FOLLOW-UP VISIT: CPT

## 2021-08-16 LAB — SARS-COV-2 N GENE NPH QL NAA+PROBE: NOT DETECTED

## 2021-09-27 ENCOUNTER — APPOINTMENT (OUTPATIENT)
Dept: ELECTROPHYSIOLOGY | Facility: CLINIC | Age: 61
End: 2021-09-27
Payer: MEDICARE

## 2021-09-27 VITALS — DIASTOLIC BLOOD PRESSURE: 72 MMHG | SYSTOLIC BLOOD PRESSURE: 100 MMHG | HEART RATE: 85 BPM | RESPIRATION RATE: 14 BRPM

## 2021-09-27 PROCEDURE — 93290 INTERROG DEV EVAL ICPMS IP: CPT | Mod: 26

## 2021-09-27 PROCEDURE — 93283 PRGRMG EVAL IMPLANTABLE DFB: CPT

## 2021-10-09 ENCOUNTER — APPOINTMENT (OUTPATIENT)
Dept: DISASTER EMERGENCY | Facility: OTHER | Age: 61
End: 2021-10-09
Payer: MEDICARE

## 2021-10-09 PROCEDURE — 0011A: CPT

## 2021-11-06 ENCOUNTER — APPOINTMENT (OUTPATIENT)
Dept: DISASTER EMERGENCY | Facility: OTHER | Age: 61
End: 2021-11-06
Payer: MEDICARE

## 2021-11-06 PROCEDURE — 0012A: CPT

## 2022-01-04 ENCOUNTER — APPOINTMENT (OUTPATIENT)
Dept: ELECTROPHYSIOLOGY | Facility: CLINIC | Age: 62
End: 2022-01-04

## 2022-03-28 ENCOUNTER — APPOINTMENT (OUTPATIENT)
Dept: ELECTROPHYSIOLOGY | Facility: CLINIC | Age: 62
End: 2022-03-28
Payer: MEDICARE

## 2022-03-28 PROCEDURE — 93283 PRGRMG EVAL IMPLANTABLE DFB: CPT

## 2022-04-20 ENCOUNTER — APPOINTMENT (OUTPATIENT)
Dept: NUCLEAR MEDICINE | Facility: IMAGING CENTER | Age: 62
End: 2022-04-20
Payer: MEDICARE

## 2022-04-20 ENCOUNTER — OUTPATIENT (OUTPATIENT)
Dept: OUTPATIENT SERVICES | Facility: HOSPITAL | Age: 62
LOS: 1 days | End: 2022-04-20
Payer: COMMERCIAL

## 2022-04-20 ENCOUNTER — APPOINTMENT (OUTPATIENT)
Dept: PULMONOLOGY | Facility: CLINIC | Age: 62
End: 2022-04-20

## 2022-04-20 ENCOUNTER — RESULT REVIEW (OUTPATIENT)
Age: 62
End: 2022-04-20

## 2022-04-20 DIAGNOSIS — Z96.649 PRESENCE OF UNSPECIFIED ARTIFICIAL HIP JOINT: Chronic | ICD-10-CM

## 2022-04-20 DIAGNOSIS — D86.9 SARCOIDOSIS, UNSPECIFIED: ICD-10-CM

## 2022-04-20 DIAGNOSIS — Z95.828 PRESENCE OF OTHER VASCULAR IMPLANTS AND GRAFTS: Chronic | ICD-10-CM

## 2022-04-20 PROCEDURE — 78451 HT MUSCLE IMAGE SPECT SING: CPT | Mod: 26

## 2022-04-20 PROCEDURE — 78815 PET IMAGE W/CT SKULL-THIGH: CPT | Mod: 26

## 2022-04-20 PROCEDURE — A9500: CPT

## 2022-04-20 PROCEDURE — 78451 HT MUSCLE IMAGE SPECT SING: CPT

## 2022-04-20 PROCEDURE — A9552: CPT

## 2022-04-20 PROCEDURE — 78815 PET IMAGE W/CT SKULL-THIGH: CPT

## 2022-04-26 ENCOUNTER — RESULT REVIEW (OUTPATIENT)
Age: 62
End: 2022-04-26

## 2022-04-27 ENCOUNTER — OUTPATIENT (OUTPATIENT)
Dept: OUTPATIENT SERVICES | Facility: HOSPITAL | Age: 62
LOS: 1 days | End: 2022-04-27
Payer: SELF-PAY

## 2022-04-27 ENCOUNTER — APPOINTMENT (OUTPATIENT)
Dept: NUCLEAR MEDICINE | Facility: IMAGING CENTER | Age: 62
End: 2022-04-27
Payer: SELF-PAY

## 2022-04-27 DIAGNOSIS — D86.9 SARCOIDOSIS, UNSPECIFIED: ICD-10-CM

## 2022-04-27 DIAGNOSIS — Z96.649 PRESENCE OF UNSPECIFIED ARTIFICIAL HIP JOINT: Chronic | ICD-10-CM

## 2022-04-27 DIAGNOSIS — Z95.828 PRESENCE OF OTHER VASCULAR IMPLANTS AND GRAFTS: Chronic | ICD-10-CM

## 2022-04-27 PROCEDURE — 78429 MYOCRD IMG PET 1 STD W/CT: CPT | Mod: 26

## 2022-04-27 PROCEDURE — A9552: CPT

## 2022-04-27 PROCEDURE — 78429 MYOCRD IMG PET 1 STD W/CT: CPT

## 2022-05-11 ENCOUNTER — APPOINTMENT (OUTPATIENT)
Dept: PULMONOLOGY | Facility: CLINIC | Age: 62
End: 2022-05-11
Payer: MEDICARE

## 2022-05-11 VITALS
SYSTOLIC BLOOD PRESSURE: 118 MMHG | HEIGHT: 73 IN | BODY MASS INDEX: 34.99 KG/M2 | WEIGHT: 264 LBS | RESPIRATION RATE: 16 BRPM | HEART RATE: 80 BPM | DIASTOLIC BLOOD PRESSURE: 77 MMHG | TEMPERATURE: 97.9 F

## 2022-05-11 DIAGNOSIS — G47.33 OBSTRUCTIVE SLEEP APNEA (ADULT) (PEDIATRIC): ICD-10-CM

## 2022-05-11 PROCEDURE — 99214 OFFICE O/P EST MOD 30 MIN: CPT

## 2022-05-11 NOTE — PHYSICAL EXAM
[General Appearance - Well Developed] : well developed [Normal Appearance] : normal appearance [Well Groomed] : well groomed [General Appearance - Well Nourished] : well nourished [No Deformities] : no deformities [General Appearance - In No Acute Distress] : no acute distress [Normal Conjunctiva] : the conjunctiva exhibited no abnormalities [Eyelids - No Xanthelasma] : the eyelids demonstrated no xanthelasmas [Low Lying Soft Palate] : low lying soft palate [III] : III [Neck Appearance] : the appearance of the neck was normal [Neck Cervical Mass (___cm)] : no neck mass was observed [Jugular Venous Distention Increased] : there was no jugular-venous distention [Thyroid Diffuse Enlargement] : the thyroid was not enlarged [Thyroid Nodule] : there were no palpable thyroid nodules [Heart Rate And Rhythm] : heart rate was normal and rhythm regular [Heart Sounds] : normal S1 and S2 [Heart Sounds Gallop] : no gallops [Murmurs] : no murmurs [Heart Sounds Pericardial Friction Rub] : no pericardial rub [] : no respiratory distress [Auscultation Breath Sounds / Voice Sounds] : lungs were clear to auscultation bilaterally [Cyanosis, Localized] : no localized cyanosis [Skin Color & Pigmentation] : normal skin color and pigmentation [Oriented To Time, Place, And Person] : oriented to person, place, and time [Impaired Insight] : insight and judgment were intact [Affect] : the affect was normal

## 2022-05-11 NOTE — HISTORY OF PRESENT ILLNESS
[FreeTextEntry1] : 60 y/o M with a PMH of GARRETT on PAP therapy presented to the sleep clinic for a follow up.\par \par He was first diagnosed with GARRETT in 2007 in [place] and has been using CPAP since that time. He recently underwent a diagnostic PSG on 8/25/2018 which showed an AHI of 19.8/hr with a T-90% of 0.0.  \par CPAP titration was watts on 2/13/2020 which showed an optimal pressure of 9 cmH2O to eliminate SDB.  He recently received his new device from [employer] but has stopped using it and reverted back to his old Resmed machine that is over 10 years old.  He is here to discuss potential option re: his device.  Otherwise, he is benefitting from treatment.  \par \par History of sarcoidosis and follows with pulmonary at [facility].\par ____________________________________________________________________\par EPWORTH SLEEPINESS SCALE\par \par How likely are you to doze off or fall asleep in the situations described below, in contrast to feeling just tired?  \par This refers to your usual way of life in recent times.  \par Even if you haven't done some of these things recently, try to work out how they would have affected you.\par Use the following scale to choose one most appropriate number for each situation.\par \par Chance of dozing.............Situation\par .............1...........................Sitting and reading\par .............1...........................Watching TV\par .............1...........................Sitting inactive in a public place (eg a theatre or a meeting)\par .............1...........................As a passenger in a car for an hour without a break\par .............1...........................Lying down to rest in the afternoon when circumstances permit\par .............0...........................Sitting and talking to someone\par .............1...........................Sitting quietly after lunch without alcohol\par .............0...........................In a car, while stopped for a few minutes in traffic\par \par ............6............................TOTAL SCORE\par \par 0 = Never would doze\par 1 = Slight chance of dozing\par 2 = Moderate chance of dozing\par 3 = High chance of dozing \par ______________________________________________________________________  [Obstructive Sleep Apnea] : obstructive sleep apnea [AHI: ___ per hour] : Apnea-hypopnea index:  [unfilled] per hour [T90%: ___] : T90%: [unfilled]% [Date: ___] : the most recent therapeutic polysomnogram was completed [unfilled] [CPAP: ___ cmH2O] : CPAP: [unfilled] cmH2O

## 2022-05-11 NOTE — ASSESSMENT
[FreeTextEntry1] : 60 y/o M with a PMH of GARRETT on PAP therapy presented to the sleep clinic for a follow up.\par \par 1. Moderate GARRETT-\par -He is deriving benefit from CPAP therapy for moderate symptomatic GARRETT with improvement in sleep quality, sleep duration, sleep fragmentation, daytime somnolence, and resolved snoring.\par -Compliance and therapeutic data was not reviewed as he has a resmed machine from 2007 with no sim card\par -Will reach out to DME company to obtain download data\par The ramifications of GARRETT and its potential therapeutic modalities were discussed with the patient. The patient was cautioned on the importance of avoiding drowsy driving\par \par Discussed the recent [employer] voluntary recall for Continuous and Non-Continuous Ventilators (certain CPAP, BiLevel PAP and Ventilator Devices) due to two issues related to the polyester-based polyurethane (PE-PUR) sound abatement foam used in these devices.  The potential harm of inhalation or ingestion of the foam particles was discussed in detail with the patient.  Symptoms such as headache, upper airway irritation, cough, chest pressure and sinus infection were discussed at length with the patient.  \par Given that the patient has moderate to severe sleep disordered breathing, the decision was made to continue therapy after a very thorough discussion but with his old machine.  He will reach out to [facility] to register his device.  Patient was counseled to not use ozone-related cleaning products and adhere to their device Instructions for Use for approved cleaning methods.\par The dangers of drowsy driving were discussed with the patient.  The patient was warned to avoid drowsy driving. The patient will follow-up after he/she has heard from the DME company.\par \par Jeffy Calloway, DO\par Pulmonary, Critical Care and Sleep Medicine \par \par

## 2022-05-11 NOTE — REVIEW OF SYSTEMS
[EDS: ESS=____] : daytime somnolence: ESS=[unfilled] [Fatigue] : no fatigue [Snoring] : no snoring [Obesity] : obesity [Depression] : depression [Negative] : Musculoskeletal

## 2022-06-29 ENCOUNTER — APPOINTMENT (OUTPATIENT)
Dept: ELECTROPHYSIOLOGY | Facility: CLINIC | Age: 62
End: 2022-06-29

## 2022-06-29 ENCOUNTER — NON-APPOINTMENT (OUTPATIENT)
Age: 62
End: 2022-06-29

## 2022-06-29 PROCEDURE — 93295 DEV INTERROG REMOTE 1/2/MLT: CPT

## 2022-06-29 PROCEDURE — 93296 REM INTERROG EVL PM/IDS: CPT

## 2022-09-28 ENCOUNTER — APPOINTMENT (OUTPATIENT)
Dept: ELECTROPHYSIOLOGY | Facility: CLINIC | Age: 62
End: 2022-09-28

## 2022-09-28 ENCOUNTER — NON-APPOINTMENT (OUTPATIENT)
Age: 62
End: 2022-09-28

## 2022-09-28 PROCEDURE — 93296 REM INTERROG EVL PM/IDS: CPT

## 2022-09-28 PROCEDURE — 93295 DEV INTERROG REMOTE 1/2/MLT: CPT

## 2022-11-07 ENCOUNTER — APPOINTMENT (OUTPATIENT)
Dept: ELECTROPHYSIOLOGY | Facility: CLINIC | Age: 62
End: 2022-11-07

## 2022-11-07 VITALS — SYSTOLIC BLOOD PRESSURE: 121 MMHG | RESPIRATION RATE: 14 BRPM | DIASTOLIC BLOOD PRESSURE: 81 MMHG | HEART RATE: 80 BPM

## 2022-11-07 PROCEDURE — 93290 INTERROG DEV EVAL ICPMS IP: CPT | Mod: 26

## 2022-11-07 PROCEDURE — 93283 PRGRMG EVAL IMPLANTABLE DFB: CPT

## 2022-11-07 RX ORDER — FERROUS SULFATE TAB EC 325 MG (65 MG FE EQUIVALENT) 325 (65 FE) MG
325 (65 FE) TABLET DELAYED RESPONSE ORAL
Refills: 0 | Status: DISCONTINUED | COMMUNITY
End: 2022-11-07

## 2022-11-07 RX ORDER — WARFARIN 6 MG/1
6 TABLET ORAL DAILY
Refills: 0 | Status: DISCONTINUED | COMMUNITY
Start: 2021-03-11 | End: 2022-11-07

## 2022-11-07 RX ORDER — FERROUS SULFATE 325(65) MG
325 (65 FE) TABLET ORAL
Qty: 30 | Refills: 6 | Status: DISCONTINUED | COMMUNITY
Start: 2021-03-12 | End: 2022-11-07

## 2022-12-28 ENCOUNTER — APPOINTMENT (OUTPATIENT)
Dept: ELECTROPHYSIOLOGY | Facility: CLINIC | Age: 62
End: 2022-12-28

## 2023-02-01 ENCOUNTER — NON-APPOINTMENT (OUTPATIENT)
Age: 63
End: 2023-02-01

## 2023-02-01 ENCOUNTER — FORM ENCOUNTER (OUTPATIENT)
Age: 63
End: 2023-02-01

## 2023-02-02 ENCOUNTER — APPOINTMENT (OUTPATIENT)
Dept: HOME HEALTH SERVICES | Facility: HOME HEALTH | Age: 63
End: 2023-02-02
Payer: MEDICARE

## 2023-02-02 DIAGNOSIS — Z87.891 PERSONAL HISTORY OF NICOTINE DEPENDENCE: ICD-10-CM

## 2023-02-02 DIAGNOSIS — Z72.0 TOBACCO USE: ICD-10-CM

## 2023-02-02 DIAGNOSIS — I50.9 HEART FAILURE, UNSPECIFIED: ICD-10-CM

## 2023-02-02 DIAGNOSIS — F17.200 NICOTINE DEPENDENCE, UNSPECIFIED, UNCOMPLICATED: ICD-10-CM

## 2023-02-02 DIAGNOSIS — Z86.711 PERSONAL HISTORY OF PULMONARY EMBOLISM: ICD-10-CM

## 2023-02-02 DIAGNOSIS — Z87.898 PERSONAL HISTORY OF OTHER SPECIFIED CONDITIONS: ICD-10-CM

## 2023-02-02 DIAGNOSIS — Z01.818 ENCOUNTER FOR OTHER PREPROCEDURAL EXAMINATION: ICD-10-CM

## 2023-02-02 DIAGNOSIS — R06.09 OTHER FORMS OF DYSPNEA: ICD-10-CM

## 2023-02-02 PROCEDURE — 99345 HOME/RES VST NEW HIGH MDM 75: CPT | Mod: 25,95

## 2023-02-02 PROCEDURE — G0506: CPT | Mod: 95

## 2023-02-02 RX ORDER — CHOLECALCIFEROL (VITAMIN D3) 10 MCG
50 MCG CAPSULE ORAL
Qty: 30 | Refills: 6 | Status: DISCONTINUED | COMMUNITY
Start: 2021-03-12 | End: 2023-02-02

## 2023-02-02 RX ORDER — FAMOTIDINE 20 MG/1
20 TABLET, FILM COATED ORAL
Refills: 0 | Status: DISCONTINUED | COMMUNITY
End: 2023-02-02

## 2023-02-02 RX ORDER — BLOOD-GLUCOSE METER
KIT MISCELLANEOUS
Qty: 1 | Refills: 0 | Status: DISCONTINUED | COMMUNITY
Start: 2021-02-16 | End: 2023-02-02

## 2023-02-02 NOTE — PHYSICAL EXAM
[No Acute Distress] : no acute distress [Well Nourished] : well nourished [Well Developed] : well developed [Normal Voice/Communication] : normal voice communication [Normal Sclera/Conjunctiva] : normal sclera/conjunctiva [Normal Outer Ear/Nose] : the ears and nose were normal in appearance [No JVD] : no jugular venous distention [No Respiratory Distress] : no respiratory distress [No Edema] : there was no peripheral edema [Not Distended] : not distended [Oriented x3] : oriented to person, place, and time [de-identified] : wearing glasses

## 2023-02-02 NOTE — CHRONIC CARE ASSESSMENT
[Walking] : walking [Low Salt Diet] : low salt [Class III] : New York Heart Association Class Output: Class III

## 2023-02-02 NOTE — COUNSELING
[Continue diet as tolerated] : continue diet as tolerated based on goals of care [Non - Smoker] : non-smoker [] : foot exam [Improve exercise tolerance] : improve exercise tolerance [Improve weight] : improve weight [Maintain functional ability] : maintain functional ability [Patient/Caregiver is unclear of wishes] : patient/caregiver is unclear of wishes [ - New patient with 2 or more chronic conditions; CCM discussed and patient-centered care plan established] : New patient with 2 or more chronic conditions; CCM discussed and patient-centered care plan established

## 2023-02-02 NOTE — REASON FOR VISIT
[Initial Evaluation] : an initial evaluation [Spouse] : spouse [Pre-Visit Preparation] : pre-visit preparation was done [Intercurrent Specialty/Sub-specialty Visits] : the patient has intercurrent specialty/sub-specialty visits [FreeTextEntry3] : [facility], PCP Dr Mcqueen

## 2023-02-08 ENCOUNTER — APPOINTMENT (OUTPATIENT)
Dept: ELECTROPHYSIOLOGY | Facility: CLINIC | Age: 63
End: 2023-02-08
Payer: MEDICARE

## 2023-02-08 ENCOUNTER — NON-APPOINTMENT (OUTPATIENT)
Age: 63
End: 2023-02-08

## 2023-02-08 PROCEDURE — 93295 DEV INTERROG REMOTE 1/2/MLT: CPT

## 2023-02-08 PROCEDURE — 93296 REM INTERROG EVL PM/IDS: CPT

## 2023-02-24 ENCOUNTER — NON-APPOINTMENT (OUTPATIENT)
Age: 63
End: 2023-02-24

## 2023-02-27 ENCOUNTER — APPOINTMENT (OUTPATIENT)
Dept: HOME HEALTH SERVICES | Facility: HOME HEALTH | Age: 63
End: 2023-02-27

## 2023-03-23 ENCOUNTER — NON-APPOINTMENT (OUTPATIENT)
Age: 63
End: 2023-03-23

## 2023-03-23 NOTE — H&P PST ADULT - BSA (M2)
PAST MEDICAL HISTORY:  CKD (chronic kidney disease)     DM (diabetes mellitus)     HLD (hyperlipidemia)     HTN (hypertension)     Intercostal neuralgia      2.32

## 2023-03-28 ENCOUNTER — APPOINTMENT (OUTPATIENT)
Dept: HOME HEALTH SERVICES | Facility: HOME HEALTH | Age: 63
End: 2023-03-28

## 2023-04-05 ENCOUNTER — NON-APPOINTMENT (OUTPATIENT)
Age: 63
End: 2023-04-05

## 2023-04-07 ENCOUNTER — APPOINTMENT (OUTPATIENT)
Dept: HOME HEALTH SERVICES | Facility: HOME HEALTH | Age: 63
End: 2023-04-07
Payer: MEDICARE

## 2023-04-07 VITALS
RESPIRATION RATE: 17 BRPM | HEART RATE: 78 BPM | SYSTOLIC BLOOD PRESSURE: 120 MMHG | DIASTOLIC BLOOD PRESSURE: 70 MMHG | TEMPERATURE: 97.4 F | OXYGEN SATURATION: 98 %

## 2023-04-07 PROCEDURE — 99349 HOME/RES VST EST MOD MDM 40: CPT

## 2023-04-07 NOTE — COUNSELING
[Continue diet as tolerated] : continue diet as tolerated based on goals of care [Non - Smoker] : non-smoker [] : foot exam [Improve exercise tolerance] : improve exercise tolerance [Improve weight] : improve weight [Maintain functional ability] : maintain functional ability [Patient/Caregiver is unclear of wishes] : patient/caregiver is unclear of wishes [Full Code] : Code Status: Full Code [No Limitations] : Treatment Guidelines: No limitations [Long Term Intubation] : Intubation: Long term intubation [[facility] Date: _____] [facility] Com[facility]/last verification date: [unfilled]

## 2023-04-07 NOTE — HISTORY OF PRESENT ILLNESS
[Patient] : patient [FreeTextEntry2] : PMH: Sarcoidosis (on chronic steroids), Epilepsy (childhood), Afib (on ELiquis), Systolic CHF, GARRETT on CPAP, , Pulmonary Embolism, Pulmonary Fibrosis, Intersitial Lung Disease S/P cardiac arrest with multi- organ failure requiring dialysis, life vest placement (2020), S/P bilateral hip replacement (2010, 2020), S/P AICD placement (2021)\par \par Pt seen in his apartment with Girlfriend, Bárbara, present. \par pt A&Ox3 reports he is doing well\par 1. Appetite/Weight: Appetite good, weight stable.\par 2. Gait/Falls: No recent falls.\par 3. Sleep: No concerns\par 4. BMs: Daily BMs, No concerns\par 5. Urine: No concerns\par 6. Skin: No rash or ulcers.\par 7. Mood/Memory: Memory intact, mood bright & affect reactive. \par \par Interval events:\par -saw primary doctor  (Dr Marcos Leiva) and cardiologist (Dr Castillo): no concerns, they both didn't want him to get colonoscopy at this time due to risk with going under anesthesia\par -Saw eye doc at [facility] last week: will get reading glasses\par  \par - Had (L) hip replacement in 11/2020 developed SOB- taken to [facility] by girlfriend- diagnosed with PE & pneumonia; suffered cardiac arrest & multi-organ failure during hospitalization- underwent dialysis for 1 month, had life vest placed on discharge- now off dialysis with AICD in place\par \par - Sarcoidosis- diagnosed in 1974- on chronic prednisone which he reports has effected his bones, no issues with DM or blood sugars\par \par -Cardiac- as above; denies SOB, palpitations, LE edema- reports recent weight loss and able to walk up hill with his walker w/o becoming SOB- on atorvastatin , carvedilol, digoxin, Eliquis, Entresto, Spironolactone, torsemide- follows with HF clinic at [facility]\par \par CKD: as above; GFR 49, creat 1.72 in 3/21; on Farxiga\par \par Pulm: as above; denies SOB; on CPAP for GARRETT\par --Pt reports Pulmonary had ordered oxygen last year but he just got it delivered  2 months ago, states he has not required any oxygen since and would like someone to  the machine as its just using up space in this house. pt denies SOB, dyspnea on excretion, able to do his daily walks for 20-30mins without any difficulty. Pt has pulse-ox machine available to monitor his oxygen and has never needed supplemental O2. O2 during this visit was  98% on RA, pt instructed to contact pulmonary to have the oxygen machine picked up.\par \par Depression: mood stable in sertraline; "no issues"\par \par Epilepsy: in childhood- was on phenobarbital when he was younger; has not has a seizure "in years"\par \par DME: Cane, walker\par \par \par

## 2023-04-07 NOTE — REASON FOR VISIT
[Follow-Up] : a follow-up visit [Pre-Visit Preparation] : pre-visit preparation was done [Intercurrent Specialty/Sub-specialty Visits] : the patient has intercurrent specialty/sub-specialty visits [Other: _____] : [unfilled] [FreeTextEntry2] : chart reviewed [FreeTextEntry3] : [facility], PCP Dr Mcqueen

## 2023-04-07 NOTE — PHYSICAL EXAM
[No Acute Distress] : no acute distress [Normal Voice/Communication] : normal voice communication [Normal Sclera/Conjunctiva] : normal sclera/conjunctiva [Normal Outer Ear/Nose] : the ears and nose were normal in appearance [No JVD] : no jugular venous distention [No Respiratory Distress] : no respiratory distress [No Edema] : there was no peripheral edema [Not Distended] : not distended [Oriented x3] : oriented to person, place, and time [de-identified] : wearing glasses

## 2023-04-07 NOTE — CHRONIC CARE ASSESSMENT
[Class III] : New York Heart Association Class Output: Class III [Walking] : walking [Low Salt Diet] : low salt

## 2023-04-07 NOTE — HEALTH RISK ASSESSMENT
[HRA Reviewed] : Health risk assessment reviewed [Independent] : managing finances [No falls in past year] : Patient reported no falls in the past year

## 2023-05-10 ENCOUNTER — NON-APPOINTMENT (OUTPATIENT)
Age: 63
End: 2023-05-10

## 2023-05-10 ENCOUNTER — APPOINTMENT (OUTPATIENT)
Dept: ELECTROPHYSIOLOGY | Facility: CLINIC | Age: 63
End: 2023-05-10
Payer: MEDICARE

## 2023-05-10 PROCEDURE — 93296 REM INTERROG EVL PM/IDS: CPT

## 2023-05-10 PROCEDURE — 93295 DEV INTERROG REMOTE 1/2/MLT: CPT

## 2023-05-16 ENCOUNTER — NON-APPOINTMENT (OUTPATIENT)
Age: 63
End: 2023-05-16

## 2023-05-18 ENCOUNTER — APPOINTMENT (OUTPATIENT)
Dept: HOME HEALTH SERVICES | Facility: HOME HEALTH | Age: 63
End: 2023-05-18

## 2023-05-23 NOTE — ED ADULT NURSE NOTE - CAS EDP DISCH TYPE
drug management.             REASSESSMENT            CONSULTS:  IP CONSULT TO ORTHOPEDIC SURGERY    PROCEDURES:  Unless otherwise noted below, none     Procedural sedation    Date/Time: 5/23/2023 4:07 PM  Performed by: Dominique Cote MD  Authorized by: Dominique Cote MD     Consent:     Consent obtained:  Written    Consent given by:  Patient    Risks discussed:  Dysrhythmia, allergic reaction, prolonged hypoxia resulting in organ damage, nausea, vomiting, respiratory compromise necessitating ventilatory assistance and intubation and inadequate sedation  Universal protocol:     Procedure explained and questions answered to patient or proxy's satisfaction: yes      Relevant documents present and verified: yes      Test results available: yes      Imaging studies available: yes      Immediately prior to procedure, a time out was called: yes      Patient identity confirmed:  Verbally with patient and arm band  Indications:     Procedure performed:  Fracture reduction    Procedure necessitating sedation performed by:  Different physician    Intended level of sedation:  Moderate  Pre-sedation assessment:     Time since last food or drink:  >12h    ASA classification: class 1 - normal, healthy patient      Mouth opening:  3 or more finger widths    Thyromental distance:  3 finger widths    Mallampati score:  II - soft palate, uvula, fauces visible    Neck mobility: normal      Pre-sedation assessments completed and reviewed: airway patency, anesthesia/sedation history, cardiovascular function, hydration status, mental status, nausea/vomiting, pain level, respiratory function and temperature      History of difficult intubation: no      Pre-sedation assessment completed:  5/23/2023 3:00 PM  Immediate pre-procedure details:     Reassessment: Patient reassessed immediately prior to procedure      Reviewed: vital signs      Verified: bag valve mask available, emergency equipment available, intubation equipment Home

## 2023-06-06 ENCOUNTER — APPOINTMENT (OUTPATIENT)
Dept: ELECTROPHYSIOLOGY | Facility: CLINIC | Age: 63
End: 2023-06-06

## 2023-06-09 ENCOUNTER — APPOINTMENT (OUTPATIENT)
Dept: ELECTROPHYSIOLOGY | Facility: CLINIC | Age: 63
End: 2023-06-09
Payer: MEDICARE

## 2023-06-09 ENCOUNTER — APPOINTMENT (OUTPATIENT)
Dept: ELECTROPHYSIOLOGY | Facility: CLINIC | Age: 63
End: 2023-06-09

## 2023-06-09 ENCOUNTER — NON-APPOINTMENT (OUTPATIENT)
Age: 63
End: 2023-06-09

## 2023-06-09 VITALS
HEART RATE: 74 BPM | HEIGHT: 73 IN | BODY MASS INDEX: 35.41 KG/M2 | OXYGEN SATURATION: 94 % | DIASTOLIC BLOOD PRESSURE: 69 MMHG | SYSTOLIC BLOOD PRESSURE: 100 MMHG

## 2023-06-09 VITALS — BODY MASS INDEX: 35.41 KG/M2 | WEIGHT: 268.38 LBS

## 2023-06-09 DIAGNOSIS — E78.5 HYPERLIPIDEMIA, UNSPECIFIED: ICD-10-CM

## 2023-06-09 PROCEDURE — 99213 OFFICE O/P EST LOW 20 MIN: CPT

## 2023-06-09 NOTE — DISCUSSION/SUMMARY
[FreeTextEntry1] : IMPRESSION:\par \par 1.CHF: S/p ICD in 2021. Device checked today..... Appears euvolemic in office. Resume OMT as prescribed, encouraged heart healthy diet, daily weight, and regular f/u with Cardiology/Heart failure team as scheduled.\par \par 2.Paroxysmal Afib: EKG performed today to assess for Afib and reveals SR. On Eliquis. Continue medications as prescribed. \par \par 3.HTN: resume oral antihypertensives as prescribed. Encouraged heart healthy diet, sodium restriction, and weight loss. Continue regular f/u with Cardiologist for further HTN management.\par \par 4.HLD: resume statin therapy as prescribed and regular f/u with Cardiologist for routine lipid monitoring and management.\par \par \par

## 2023-06-09 NOTE — HISTORY OF PRESENT ILLNESS
[FreeTextEntry1] : Mr. MARIAM GALLAGHER  is a 62 year man  with past medical history of HTN, HLD, Epilepsy, Pulmonary fibrosis, ESRD on HF (T/TR/Sat) via R chest Permacath, hip surgery c/b PE and possible paroxysmal atrial flutter, Afib on Eliquis, GARRETT on CPAP, and chronic systolic CHF s/p ICD on 7/21/21 who presents here for initial visit. Admits doing well. ICD paceart report on 5/10/23 shows 1 NSVT, few SVT detections, no shocks. On Eliquis. On OMT. Remains asymptomatic. Device is interrogated today. Feeling well. Denies any chest pain, WILBURN, palpitations, syncope.\par \par Patient was advised to come to clinic because of a recent advisory for the [employer] ICD.  The software was uploaded to the device.\par

## 2023-07-28 ENCOUNTER — APPOINTMENT (OUTPATIENT)
Dept: HOME HEALTH SERVICES | Facility: HOME HEALTH | Age: 63
End: 2023-07-28
Payer: MEDICARE

## 2023-07-28 ENCOUNTER — NON-APPOINTMENT (OUTPATIENT)
Age: 63
End: 2023-07-28

## 2023-07-28 VITALS
OXYGEN SATURATION: 98 % | RESPIRATION RATE: 18 BRPM | WEIGHT: 261 LBS | TEMPERATURE: 96 F | SYSTOLIC BLOOD PRESSURE: 128 MMHG | HEART RATE: 72 BPM | BODY MASS INDEX: 34.44 KG/M2 | DIASTOLIC BLOOD PRESSURE: 80 MMHG

## 2023-07-28 PROCEDURE — 99349 HOME/RES VST EST MOD MDM 40: CPT

## 2023-07-28 NOTE — REASON FOR VISIT
[Follow-Up] : a follow-up visit [Other: _____] : [unfilled] [Pre-Visit Preparation] : pre-visit preparation was done [Intercurrent Specialty/Sub-specialty Visits] : the patient has intercurrent specialty/sub-specialty visits [FreeTextEntry2] : chart reviewed [FreeTextEntry3] : [facility], PCP Dr Mcqueen

## 2023-07-28 NOTE — COUNSELING
[Continue diet as tolerated] : continue diet as tolerated based on goals of care [Non - Smoker] : non-smoker [] : foot exam [Improve exercise tolerance] : improve exercise tolerance [Improve weight] : improve weight [Maintain functional ability] : maintain functional ability [Patient/Caregiver is unclear of wishes] : patient/caregiver is unclear of wishes [Full Code] : Code Status: Full Code [No Limitations] : Treatment Guidelines: No limitations [Long Term Intubation] : Intubation: Long term intubation [[facility]: _____] [facility] Com[facility]/last verification date: [unfilled]

## 2023-07-28 NOTE — HISTORY OF PRESENT ILLNESS
[Patient] : patient [Patient is stable] : patient is stable [Education provided] : education provided [FreeTextEntry2] : PMH: Sarcoidosis, Epilepsy (childhood), Afib (on ELiquis), Systolic CHF, GARRETT on CPAP, Pulmonary Fibrosis, Interstitial Lung Disease, S/P cardiac arrest with multi- organ failure requiring dialysis, CKD, S/P AICD placement (2021).\par \par Pt seen in his apartment with Girlfriend Bárbara present. \par Pt A&Ox3 reports he is doing well.\par \par -Sarcoidosis/ Pulmonary fibrosis/GARRETT: diagnosed in 1974- on chronic prednisone 5 mg daily which he reports has effected his bones, no issues with DM or blood sugars, denies SOB; on CPAP for GARRETT. Albuterol PRN.\par \par Pt reports he has not required any oxygen since and would like someone to  the machine as its just using up space in this house. pt denies SOB, dyspnea on excretion, able to do his daily walks for 20-30 mins without any difficulty. Pt has pulse-ox machine available to monitor his oxygen and has never needed supplemental O2. Sat O2 during this visit is 98% on RA, pt instructed to contact pulmonary to have the oxygen machine picked up.\par \par -CHF/Afib/HTN/HLD: denies SOB, palpitations, LE edema. Reports recent weight loss and able to walk up hill with his walker w/o becoming SOB- on atorvastatin 40 mg daily, carvedilol 12.5 mg twice a day, digoxin, Eliquis 5 mg twice a day, Entresto 24-26 mg twice a day, Spironolactone 25 mg once a day, torsemide 20 mg daily, follows with HF clinic at [facility].\par \par -CKD: off HD since 2022, on Farxiga 10 mg daily.\par \par -Depression: mood stable in sertraline 100 mg daily; "no issues".\par \par -Epilepsy: in childhood- was on phenobarbital when he was younger; has not has a seizure "in years".\par \par 1. Appetite/Weight: Appetite good, weight is stable.\par 2. Gait/Falls: No recent falls.\par 3. Sleep: No concerns\par 4. BMs: Daily BMs, No concerns\par 5. Urine: No concerns\par 6. Skin: No rash or ulcers.\par 7. Mood/Memory: Memory intact, mood bright & affect reactive. \par \par Interval events:\par -saw cardiologist (Dr Castillo) on 07/28: no concerns.\par -Pt has an MD appointment w cardiologist Dr. Moreira on 11/08/23 regarding cardioverter-defibrilator.\par  \par - Had (L) hip replacement in 11/2020 developed SOB- taken to [facility] by girlfriend- diagnosed with PE & pneumonia; suffered cardiac arrest & multi-organ failure during hospitalization- underwent dialysis for 1 month, had life vest placed on discharge- now off dialysis with AICD in place\par \par DME: Cane, rollator.\par \par \par

## 2023-07-28 NOTE — PHYSICAL EXAM
[No Acute Distress] : no acute distress [Normal Voice/Communication] : normal voice communication [Normal Sclera/Conjunctiva] : normal sclera/conjunctiva [Normal Outer Ear/Nose] : the ears and nose were normal in appearance [No JVD] : no jugular venous distention [No Respiratory Distress] : no respiratory distress [No Edema] : there was no peripheral edema [Not Distended] : not distended [Oriented x3] : oriented to person, place, and time [de-identified] : wearing glasses

## 2023-08-09 ENCOUNTER — NON-APPOINTMENT (OUTPATIENT)
Age: 63
End: 2023-08-09

## 2023-08-09 ENCOUNTER — APPOINTMENT (OUTPATIENT)
Dept: ELECTROPHYSIOLOGY | Facility: CLINIC | Age: 63
End: 2023-08-09
Payer: MEDICARE

## 2023-08-09 PROCEDURE — 93295 DEV INTERROG REMOTE 1/2/MLT: CPT

## 2023-08-09 PROCEDURE — 93296 REM INTERROG EVL PM/IDS: CPT

## 2023-09-15 ENCOUNTER — APPOINTMENT (OUTPATIENT)
Dept: HOME HEALTH SERVICES | Facility: HOME HEALTH | Age: 63
End: 2023-09-15

## 2023-09-20 ENCOUNTER — APPOINTMENT (OUTPATIENT)
Dept: HOME HEALTH SERVICES | Facility: HOME HEALTH | Age: 63
End: 2023-09-20

## 2023-11-13 ENCOUNTER — APPOINTMENT (OUTPATIENT)
Dept: ELECTROPHYSIOLOGY | Facility: CLINIC | Age: 63
End: 2023-11-13
Payer: MEDICARE

## 2023-11-13 ENCOUNTER — NON-APPOINTMENT (OUTPATIENT)
Age: 63
End: 2023-11-13

## 2023-11-13 VITALS — DIASTOLIC BLOOD PRESSURE: 78 MMHG | SYSTOLIC BLOOD PRESSURE: 122 MMHG | HEART RATE: 80 BPM

## 2023-11-13 PROCEDURE — 93283 PRGRMG EVAL IMPLANTABLE DFB: CPT

## 2023-12-05 ENCOUNTER — APPOINTMENT (OUTPATIENT)
Dept: HOME HEALTH SERVICES | Facility: HOME HEALTH | Age: 63
End: 2023-12-05
Payer: MEDICARE

## 2023-12-05 VITALS
DIASTOLIC BLOOD PRESSURE: 88 MMHG | SYSTOLIC BLOOD PRESSURE: 124 MMHG | TEMPERATURE: 97.3 F | OXYGEN SATURATION: 95 % | HEART RATE: 76 BPM | RESPIRATION RATE: 18 BRPM

## 2023-12-05 DIAGNOSIS — I10 ESSENTIAL (PRIMARY) HYPERTENSION: ICD-10-CM

## 2023-12-05 DIAGNOSIS — G47.33 OBSTRUCTIVE SLEEP APNEA (ADULT) (PEDIATRIC): ICD-10-CM

## 2023-12-05 DIAGNOSIS — Z23 ENCOUNTER FOR IMMUNIZATION: ICD-10-CM

## 2023-12-05 DIAGNOSIS — F32.A DEPRESSION, UNSPECIFIED: ICD-10-CM

## 2023-12-05 DIAGNOSIS — D86.9 SARCOIDOSIS, UNSPECIFIED: ICD-10-CM

## 2023-12-05 PROCEDURE — G0008: CPT

## 2023-12-05 PROCEDURE — 90662 IIV NO PRSV INCREASED AG IM: CPT

## 2023-12-05 PROCEDURE — 99349 HOME/RES VST EST MOD MDM 40: CPT | Mod: 25

## 2024-01-07 ENCOUNTER — APPOINTMENT (OUTPATIENT)
Dept: HOME HEALTH SERVICES | Facility: HOME HEALTH | Age: 64
End: 2024-01-07

## 2024-01-07 ENCOUNTER — NON-APPOINTMENT (OUTPATIENT)
Age: 64
End: 2024-01-07

## 2024-01-07 DIAGNOSIS — L03.90 CELLULITIS, UNSPECIFIED: ICD-10-CM

## 2024-01-07 RX ORDER — SACUBITRIL AND VALSARTAN 24; 26 MG/1; MG/1
24-26 TABLET, FILM COATED ORAL TWICE DAILY
Qty: 60 | Refills: 3 | Status: ACTIVE | COMMUNITY
Start: 2021-06-07

## 2024-01-07 RX ORDER — SPIRONOLACTONE 25 MG/1
25 TABLET ORAL
Qty: 90 | Refills: 3 | Status: ACTIVE | COMMUNITY

## 2024-01-07 RX ORDER — PREDNISONE 5 MG/1
5 TABLET ORAL
Qty: 30 | Refills: 6 | Status: ACTIVE | COMMUNITY
Start: 2021-03-11

## 2024-01-07 RX ORDER — CLINDAMYCIN HYDROCHLORIDE 150 MG/1
150 CAPSULE ORAL
Qty: 21 | Refills: 0 | Status: ACTIVE | COMMUNITY
Start: 2024-01-07

## 2024-01-07 RX ORDER — ATORVASTATIN CALCIUM 40 MG/1
40 TABLET, FILM COATED ORAL
Qty: 1 | Refills: 3 | Status: ACTIVE | COMMUNITY

## 2024-01-07 RX ORDER — CARVEDILOL 12.5 MG/1
12.5 TABLET, FILM COATED ORAL
Qty: 30 | Refills: 3 | Status: ACTIVE | COMMUNITY
Start: 2021-03-11

## 2024-01-07 RX ORDER — DIGOXIN 125 UG/1
125 TABLET ORAL
Qty: 90 | Refills: 0 | Status: ACTIVE | COMMUNITY
Start: 2021-03-11

## 2024-01-07 RX ORDER — APIXABAN 5 MG/1
5 TABLET, FILM COATED ORAL
Qty: 60 | Refills: 3 | Status: ACTIVE | COMMUNITY

## 2024-01-07 RX ORDER — CLINDAMYCIN HYDROCHLORIDE 300 MG/1
300 CAPSULE ORAL
Qty: 21 | Refills: 0 | Status: ACTIVE | COMMUNITY
Start: 2024-01-07

## 2024-01-07 RX ORDER — ALBUTEROL SULFATE 2.5 MG/3ML
(2.5 MG/3ML) SOLUTION RESPIRATORY (INHALATION)
Qty: 1 | Refills: 5 | Status: ACTIVE | COMMUNITY
Start: 2023-02-28

## 2024-01-07 RX ORDER — TORSEMIDE 20 MG/1
20 TABLET ORAL DAILY
Qty: 90 | Refills: 0 | Status: ACTIVE | COMMUNITY

## 2024-01-08 ENCOUNTER — APPOINTMENT (OUTPATIENT)
Dept: HOME HEALTH SERVICES | Facility: HOME HEALTH | Age: 64
End: 2024-01-08

## 2024-01-09 ENCOUNTER — NON-APPOINTMENT (OUTPATIENT)
Age: 64
End: 2024-01-09

## 2024-01-12 ENCOUNTER — APPOINTMENT (OUTPATIENT)
Dept: HOME HEALTH SERVICES | Facility: HOME HEALTH | Age: 64
End: 2024-01-12
Payer: MEDICARE

## 2024-01-12 VITALS
DIASTOLIC BLOOD PRESSURE: 87 MMHG | OXYGEN SATURATION: 97 % | HEART RATE: 75 BPM | RESPIRATION RATE: 18 BRPM | SYSTOLIC BLOOD PRESSURE: 125 MMHG | TEMPERATURE: 97.3 F

## 2024-01-12 DIAGNOSIS — Z99.2 DEPENDENCE ON RENAL DIALYSIS: ICD-10-CM

## 2024-01-12 DIAGNOSIS — N17.9 ACUTE KIDNEY FAILURE, UNSPECIFIED: ICD-10-CM

## 2024-01-12 DIAGNOSIS — J84.9 INTERSTITIAL PULMONARY DISEASE, UNSPECIFIED: ICD-10-CM

## 2024-01-12 DIAGNOSIS — I70.0 ATHEROSCLEROSIS OF AORTA: ICD-10-CM

## 2024-01-12 DIAGNOSIS — J84.10 PULMONARY FIBROSIS, UNSPECIFIED: ICD-10-CM

## 2024-01-12 DIAGNOSIS — N18.30 CHRONIC KIDNEY DISEASE, STAGE 3 UNSPECIFIED: ICD-10-CM

## 2024-01-12 DIAGNOSIS — G40.909 EPILEPSY, UNSPECIFIED, NOT INTRACTABLE, W/OUT STATUS EPILEPTICUS: ICD-10-CM

## 2024-01-12 DIAGNOSIS — I50.9 HEART FAILURE, UNSPECIFIED: ICD-10-CM

## 2024-01-12 DIAGNOSIS — I50.22 CHRONIC SYSTOLIC (CONGESTIVE) HEART FAILURE: ICD-10-CM

## 2024-01-12 DIAGNOSIS — Z71.89 OTHER SPECIFIED COUNSELING: ICD-10-CM

## 2024-01-12 DIAGNOSIS — I77.1 STRICTURE OF ARTERY: ICD-10-CM

## 2024-01-12 DIAGNOSIS — I48.91 UNSPECIFIED ATRIAL FIBRILLATION: ICD-10-CM

## 2024-01-12 PROCEDURE — 99348 HOME/RES VST EST LOW MDM 30: CPT | Mod: 25

## 2024-01-12 PROCEDURE — 99497 ADVNCD CARE PLAN 30 MIN: CPT

## 2024-01-12 NOTE — COUNSELING
[Continue diet as tolerated] : continue diet as tolerated based on goals of care [Non - Smoker] : non-smoker [Improve exercise tolerance] : improve exercise tolerance [Improve weight] : improve weight [Maintain functional ability] : maintain functional ability [Patient/Caregiver is unclear of wishes] : patient/caregiver is unclear of wishes [Full Code] : Code Status: Full Code [No Limitations] : Treatment Guidelines: No limitations [Long Term Intubation] : Intubation: Long term intubation [Last Ver[facility] Date: _____] [facility] Com[facility]/last verification date: [unfilled] [] : HIV screening

## 2024-01-15 PROBLEM — G40.909 EPILEPSY: Status: RESOLVED | Noted: 2021-03-11 | Resolved: 2024-01-15

## 2024-01-15 PROBLEM — N18.30 CKD (CHRONIC KIDNEY DISEASE) STAGE 3, GFR 30-59 ML/MIN: Status: ACTIVE | Noted: 2023-02-02

## 2024-01-15 PROBLEM — Z99.2 DEPENDENCE ON INTERMITTENT RENAL DIALYSIS: Status: RESOLVED | Noted: 2021-03-11 | Resolved: 2024-01-15

## 2024-01-15 PROBLEM — I77.1 TORTUOUS AORTA: Status: ACTIVE | Noted: 2023-02-02

## 2024-01-15 PROBLEM — I50.9 MULTI-ORGAN FAILURE WITH HEART FAILURE: Status: RESOLVED | Noted: 2023-02-02 | Resolved: 2024-01-15

## 2024-01-15 PROBLEM — J84.10 PULMONARY FIBROSIS: Status: ACTIVE | Noted: 2023-02-02

## 2024-01-15 PROBLEM — N17.9 AKI (ACUTE KIDNEY INJURY): Status: RESOLVED | Noted: 2021-02-23 | Resolved: 2023-02-02

## 2024-01-15 PROBLEM — J84.9 INTERSTITIAL LUNG DISEASE: Status: ACTIVE | Noted: 2023-02-02

## 2024-01-15 PROBLEM — I70.0 AORTIC CALCIFICATION: Status: ACTIVE | Noted: 2023-02-02

## 2024-01-15 PROBLEM — I48.91 ATRIAL FIBRILLATION: Status: ACTIVE | Noted: 2021-03-11

## 2024-01-15 PROBLEM — I50.22 CHRONIC SYSTOLIC CHF, NYHA CLASS 2 AND ACC/AHA STAGE C: Status: ACTIVE | Noted: 2021-06-14

## 2024-01-15 PROBLEM — Z71.89 ADVANCED CARE PLANNING/COUNSELING DISCUSSION: Status: ACTIVE | Noted: 2024-01-15

## 2024-01-15 NOTE — HISTORY OF PRESENT ILLNESS
[House Calls Co-Management Patient] : [unfilled] is a House Calls co-management patient [Patient is stable] : patient is stable [Education provided] : education provided [Patient] : patient [FreeTextEntry1] : CHF, pulmonary fibrosis and ILD [FreeTextEntry2] : PMH: Sarcoidosis, Epilepsy (childhood), Afib (on ELiquis), Systolic CHF, GARRETT on CPAP, Pulmonary Fibrosis, Interstitial Lung Disease, S/P cardiac arrest with multi- organ failure requiring dialysis, CKD, S/P AICD placement (2021). [Had (L) hip replacement in 11/2020 developed SOB- taken to [facility] by girlfriend- diagnosed with PE & pneumonia; suffered cardiac arrest & multi-organ failure during hospitalization- underwent dialysis for 1 month, had life vest placed on discharge- now off dialysis with AICD in place]  Interval events: ED visit as above. Saw hand specialist last week, this coming week, planned for further xrays of the hand.   Pt seen in his apartment. Walking well with a cane. Uses a walker for long walks. Girlfriend is a great source of support.  Pt A&Ox3 reports he is doing well. Using menthol on finger for pain relief.   -Sarcoidosis/ Pulmonary fibrosis/GARRETT: diagnosed in 1974- on chronic prednisone 5 mg daily which he reports has affected his bones, no issues with DM or blood sugars, denies SOB; on CPAP for GARRETT. Albuterol PRN. No longer on supplemental O2  -CHF/Afib/HTN/HLD: denies SOB, palpitations, LE edema. Reports recent weight loss and able to walk up hill with his walker w/o becoming SOB- on atorvastatin 40 mg daily, carvedilol 12.5 mg twice a day, digoxin, Eliquis 5 mg twice a day, Entresto 24-26 mg twice a day, Spironolactone 25 mg once a day, torsemide 20 mg daily, follows with HF clinic with Dr Castillo at [facility].  -CKD: off HD since 2022, on Farxiga 10 mg daily.  -Depression: mood stable in sertraline 100 mg daily; mood is "good". "I stay away from negative people"  -Epilepsy: in childhood- was on phenobarbital when he was younger; has not has a seizure in many years  1. Appetite/Weight: Appetite good, weight is stable.  2. Gait/Falls: No recent falls. Walks with a cane, has rollator for longer walks 3. Sleep: No concerns. Uses CPAP at night.  4. BMs: Daily BMs, No concerns 5. Urine: No concerns 6. Skin: No rash or ulcers.

## 2024-01-15 NOTE — REASON FOR VISIT
[Pre-Visit Preparation] : Pre-visit preparation was done [Intercurrent Specialty/Sub-specialty Visits] : The patient has intercurrent specialty/sub-specialty visits [FreeTextEntry3] : POST ED visit [FreeTextEntry2] : Pt had splinter in R thumb, removed splinter but thumb swelled and was painful so went to ED [facility] on 1/3/24. did xrays and did not have any findings, was sent home on 450mg clindamycin 7 days. Recommended f/u with hand specialist [FreeTextEntry4] : chart review [FreeTextEntry5] : hand specialist

## 2024-01-15 NOTE — HEALTH RISK ASSESSMENT
[HRA Reviewed] : Health risk assessment reviewed [Independent] : managing finances [No falls in past year] : Patient reported no falls in the past year [Yes] : The patient does have visual impairment [TimeGetUpGo] : 10

## 2024-01-15 NOTE — PHYSICAL EXAM
[Normal Voice/Communication] : normal voice communication [No JVD] : no jugular venous distention [No Joint Swelling] : no joint swelling seen [Normal Strength/Tone] : muscle strength and tone were normal [No Rash] : no rash [Kyphosis] : no kyphosis present [Scoliosis] : scoliosis not present [No Acute Distress] : no acute distress [Normal Sclera/Conjunctiva] : normal sclera/conjunctiva [Normal Outer Ear/Nose] : the ears and nose were normal in appearance [Supple] : the neck was supple [No Respiratory Distress] : no respiratory distress [Clear to Auscultation] : lungs were clear to auscultation bilaterally [No Accessory Muscle Use] : no accessory muscle use [Normal Rate] : heart rate was normal  [Regular Rhythm] : with a regular rhythm [Normal S1, S2] : normal S1 and S2 [No Murmurs] : no murmurs heard [No Edema] : there was no peripheral edema [Normal Bowel Sounds] : normal bowel sounds [Non Tender] : non-tender [Soft] : abdomen soft [Not Distended] : not distended [No Spinal Tenderness] : no spinal tenderness [No Clubbing, Cyanosis] : no clubbing  or cyanosis of the fingernails [No Motor Deficits] : the motor exam was normal [No Gross Sensory Deficits] : no gross sensory deficits [Oriented x3] : oriented to person, place, and time [Normal Affect] : the affect was normal [Normal Mood] : the mood was normal [Normal Insight/Judgement] : insight and judgment were intact [de-identified] : wears glasses [de-identified] : R thumb with swelling [de-identified] : R thumb with healing ecchymosis

## 2024-01-17 ENCOUNTER — LABORATORY RESULT (OUTPATIENT)
Age: 64
End: 2024-01-17

## 2024-01-18 ENCOUNTER — LABORATORY RESULT (OUTPATIENT)
Age: 64
End: 2024-01-18

## 2024-01-18 ENCOUNTER — NON-APPOINTMENT (OUTPATIENT)
Age: 64
End: 2024-01-18

## 2024-01-19 ENCOUNTER — LABORATORY RESULT (OUTPATIENT)
Age: 64
End: 2024-01-19

## 2024-01-22 ENCOUNTER — LABORATORY RESULT (OUTPATIENT)
Age: 64
End: 2024-01-22

## 2024-01-23 ENCOUNTER — LABORATORY RESULT (OUTPATIENT)
Age: 64
End: 2024-01-23

## 2024-01-23 ENCOUNTER — TRANSCRIPTION ENCOUNTER (OUTPATIENT)
Age: 64
End: 2024-01-23

## 2024-01-30 ENCOUNTER — APPOINTMENT (OUTPATIENT)
Dept: HOME HEALTH SERVICES | Facility: HOME HEALTH | Age: 64
End: 2024-01-30

## 2024-01-30 RX ORDER — SERTRALINE HYDROCHLORIDE 100 MG/1
100 TABLET, FILM COATED ORAL DAILY
Qty: 90 | Refills: 3 | Status: ACTIVE | COMMUNITY
Start: 2021-03-11 | End: 1900-01-01

## 2024-01-30 RX ORDER — DAPAGLIFLOZIN 10 MG/1
10 TABLET, FILM COATED ORAL
Qty: 90 | Refills: 3 | Status: ACTIVE | COMMUNITY
Start: 2023-02-02 | End: 1900-01-01

## 2024-01-31 ENCOUNTER — LABORATORY RESULT (OUTPATIENT)
Age: 64
End: 2024-01-31

## 2024-02-01 ENCOUNTER — LABORATORY RESULT (OUTPATIENT)
Age: 64
End: 2024-02-01

## 2024-02-02 ENCOUNTER — LABORATORY RESULT (OUTPATIENT)
Age: 64
End: 2024-02-02

## 2024-02-05 ENCOUNTER — LABORATORY RESULT (OUTPATIENT)
Age: 64
End: 2024-02-05

## 2024-02-05 ENCOUNTER — NON-APPOINTMENT (OUTPATIENT)
Age: 64
End: 2024-02-05

## 2024-02-05 ENCOUNTER — APPOINTMENT (OUTPATIENT)
Dept: ELECTROPHYSIOLOGY | Facility: CLINIC | Age: 64
End: 2024-02-05
Payer: MEDICARE

## 2024-02-05 PROCEDURE — 93295 DEV INTERROG REMOTE 1/2/MLT: CPT

## 2024-02-05 PROCEDURE — 93296 REM INTERROG EVL PM/IDS: CPT

## 2024-02-06 ENCOUNTER — LABORATORY RESULT (OUTPATIENT)
Age: 64
End: 2024-02-06

## 2024-05-06 ENCOUNTER — APPOINTMENT (OUTPATIENT)
Dept: ELECTROPHYSIOLOGY | Facility: CLINIC | Age: 64
End: 2024-05-06
Payer: MEDICARE

## 2024-05-06 ENCOUNTER — NON-APPOINTMENT (OUTPATIENT)
Age: 64
End: 2024-05-06

## 2024-05-06 PROCEDURE — 93296 REM INTERROG EVL PM/IDS: CPT

## 2024-05-06 PROCEDURE — 93295 DEV INTERROG REMOTE 1/2/MLT: CPT

## 2024-08-05 ENCOUNTER — APPOINTMENT (OUTPATIENT)
Dept: ELECTROPHYSIOLOGY | Facility: CLINIC | Age: 64
End: 2024-08-05

## 2024-08-05 ENCOUNTER — NON-APPOINTMENT (OUTPATIENT)
Age: 64
End: 2024-08-05

## 2024-08-05 PROCEDURE — 93296 REM INTERROG EVL PM/IDS: CPT

## 2024-08-05 PROCEDURE — 93295 DEV INTERROG REMOTE 1/2/MLT: CPT

## 2024-11-11 ENCOUNTER — APPOINTMENT (OUTPATIENT)
Dept: ELECTROPHYSIOLOGY | Facility: CLINIC | Age: 64
End: 2024-11-11

## 2024-12-02 ENCOUNTER — APPOINTMENT (OUTPATIENT)
Dept: GASTROENTEROLOGY | Facility: CLINIC | Age: 64
End: 2024-12-02
Payer: MEDICARE

## 2024-12-02 VITALS
DIASTOLIC BLOOD PRESSURE: 60 MMHG | HEIGHT: 71 IN | HEART RATE: 70 BPM | SYSTOLIC BLOOD PRESSURE: 118 MMHG | BODY MASS INDEX: 36.26 KG/M2 | WEIGHT: 259 LBS | OXYGEN SATURATION: 97 %

## 2024-12-02 DIAGNOSIS — R19.5 OTHER FECAL ABNORMALITIES: ICD-10-CM

## 2024-12-02 DIAGNOSIS — K21.9 GASTRO-ESOPHAGEAL REFLUX DISEASE W/OUT ESOPHAGITIS: ICD-10-CM

## 2024-12-02 PROCEDURE — 99204 OFFICE O/P NEW MOD 45 MIN: CPT

## 2024-12-02 RX ORDER — POLYETHYLENE GLYCOL 3350 AND ELECTROLYTES WITH LEMON FLAVOR 236; 22.74; 6.74; 5.86; 2.97 G/4L; G/4L; G/4L; G/4L; G/4L
236 POWDER, FOR SOLUTION ORAL
Qty: 1 | Refills: 0 | Status: ACTIVE | COMMUNITY
Start: 2024-12-02 | End: 1900-01-01

## 2024-12-10 ENCOUNTER — APPOINTMENT (OUTPATIENT)
Dept: ELECTROPHYSIOLOGY | Facility: CLINIC | Age: 64
End: 2024-12-10

## 2025-01-08 ENCOUNTER — OUTPATIENT (OUTPATIENT)
Dept: OUTPATIENT SERVICES | Facility: HOSPITAL | Age: 65
LOS: 1 days | End: 2025-01-08
Payer: COMMERCIAL

## 2025-01-08 VITALS
HEART RATE: 78 BPM | TEMPERATURE: 98 F | WEIGHT: 261.91 LBS | RESPIRATION RATE: 16 BRPM | SYSTOLIC BLOOD PRESSURE: 117 MMHG | HEIGHT: 71 IN | OXYGEN SATURATION: 93 % | DIASTOLIC BLOOD PRESSURE: 80 MMHG

## 2025-01-08 DIAGNOSIS — Z95.828 PRESENCE OF OTHER VASCULAR IMPLANTS AND GRAFTS: Chronic | ICD-10-CM

## 2025-01-08 DIAGNOSIS — Z01.818 ENCOUNTER FOR OTHER PREPROCEDURAL EXAMINATION: ICD-10-CM

## 2025-01-08 DIAGNOSIS — R19.5 OTHER FECAL ABNORMALITIES: ICD-10-CM

## 2025-01-08 DIAGNOSIS — K21.9 GASTRO-ESOPHAGEAL REFLUX DISEASE WITHOUT ESOPHAGITIS: ICD-10-CM

## 2025-01-08 DIAGNOSIS — Z95.810 PRESENCE OF AUTOMATIC (IMPLANTABLE) CARDIAC DEFIBRILLATOR: Chronic | ICD-10-CM

## 2025-01-08 DIAGNOSIS — I50.9 HEART FAILURE, UNSPECIFIED: ICD-10-CM

## 2025-01-08 DIAGNOSIS — Z96.649 PRESENCE OF UNSPECIFIED ARTIFICIAL HIP JOINT: Chronic | ICD-10-CM

## 2025-01-08 DIAGNOSIS — Z95.810 PRESENCE OF AUTOMATIC (IMPLANTABLE) CARDIAC DEFIBRILLATOR: ICD-10-CM

## 2025-01-08 LAB
ANION GAP SERPL CALC-SCNC: 12 MMOL/L — SIGNIFICANT CHANGE UP (ref 5–17)
BUN SERPL-MCNC: 20 MG/DL — SIGNIFICANT CHANGE UP (ref 7–23)
CALCIUM SERPL-MCNC: 9 MG/DL — SIGNIFICANT CHANGE UP (ref 8.4–10.5)
CHLORIDE SERPL-SCNC: 109 MMOL/L — HIGH (ref 96–108)
CO2 SERPL-SCNC: 18 MMOL/L — LOW (ref 22–31)
CREAT SERPL-MCNC: 1.62 MG/DL — HIGH (ref 0.5–1.3)
EGFR: 47 ML/MIN/1.73M2 — LOW
GLUCOSE SERPL-MCNC: 120 MG/DL — HIGH (ref 70–99)
POTASSIUM SERPL-MCNC: 4.8 MMOL/L — SIGNIFICANT CHANGE UP (ref 3.5–5.3)
POTASSIUM SERPL-SCNC: 4.8 MMOL/L — SIGNIFICANT CHANGE UP (ref 3.5–5.3)
SODIUM SERPL-SCNC: 139 MMOL/L — SIGNIFICANT CHANGE UP (ref 135–145)

## 2025-01-08 PROCEDURE — 80048 BASIC METABOLIC PNL TOTAL CA: CPT

## 2025-01-08 PROCEDURE — G0463: CPT

## 2025-01-08 RX ORDER — TORSEMIDE 10 MG/1
1 TABLET ORAL
Refills: 0 | DISCHARGE

## 2025-01-08 RX ORDER — APIXABAN 5 MG/1
1 TABLET, FILM COATED ORAL
Refills: 0 | DISCHARGE

## 2025-01-08 RX ORDER — DAPAGLIFLOZIN 5 MG/1
1 TABLET, FILM COATED ORAL
Refills: 0 | DISCHARGE

## 2025-01-08 RX ORDER — ATORVASTATIN CALCIUM 40 MG/1
1 TABLET, FILM COATED ORAL
Refills: 0 | DISCHARGE

## 2025-01-08 RX ORDER — SPIRONOLACTONE 50 MG/1
1 TABLET ORAL
Refills: 0 | DISCHARGE

## 2025-01-08 NOTE — H&P PST ADULT - HISTORY OF PRESENT ILLNESS
64  year old male with history of epilepsy (last seizure in 2014, off meds), HLD, pulmonary sarcoidosis/fibrosis (was prescribed O2 once 3 years ago, never used), GARRETT on C-PAP, THR in 2020 c/b PE and A-fib on Eliquis, heart failure EF 20 to 30% status post cardioversion 2021 and AICD (2021), DOUG s/p CHF exacerbation in 2020, was on hemodialysis until 4/2021,CKD, who presents for evaluation of positive FIT (fecal immunochemical test), denies any GI symptoms. Patient is scheduled for EGD/Colonoscopy on 01/17/25.

## 2025-01-08 NOTE — H&P PST ADULT - OTHER CARE PROVIDERS
JOSE Castillo NP (heart failure clinic [facility]), seen 08/2024, Pulm Clinic [facility] [phone]

## 2025-01-08 NOTE — H&P PST ADULT - ASSESSMENT
DASI score: 6.2  DASI activity: walks for 30m min daily, ADLs  Loose teeth or denture: partial dentures, doesn't wear, denies loose teeth

## 2025-01-08 NOTE — H&P PST ADULT - NSICDXPASTMEDICALHX_GEN_ALL_CORE_FT
PAST MEDICAL HISTORY:  DOUG (acute kidney injury) ESRD - last dialysis 4/2021    Anemia     Atrial fibrillation and flutter 12/2020    CKD (chronic kidney disease)     Depression     Dyspnea on exertion     H/O CHF exacerbation 12/2020    H/O iron deficiency     History of epilepsy last seizure 2014    HLD (hyperlipidemia)     HTN (hypertension)     Hyperlipidemia     Interstitial lung disease     GARRETT on CPAP     Pulmonary embolism 12/2020    Sarcoidosis pulmonary dx age 14    Tortuous aorta

## 2025-01-08 NOTE — H&P PST ADULT - NSICDXPASTSURGICALHX_GEN_ALL_CORE_FT
PAST SURGICAL HISTORY:  History of total hip replacement left 11/2020, and right 3/2010    Presence of automatic cardioverter/defibrillator (AICD)     S/P dialysis catheter insertion Right chest, removed early 3/2021

## 2025-01-24 PROBLEM — E78.5 HYPERLIPIDEMIA, UNSPECIFIED: Chronic | Status: ACTIVE | Noted: 2025-01-08

## 2025-01-24 PROBLEM — R06.09 OTHER FORMS OF DYSPNEA: Chronic | Status: ACTIVE | Noted: 2025-01-08

## 2025-01-24 PROBLEM — I77.1 STRICTURE OF ARTERY: Chronic | Status: ACTIVE | Noted: 2025-01-08

## 2025-01-24 PROBLEM — Z86.39 PERSONAL HISTORY OF OTHER ENDOCRINE, NUTRITIONAL AND METABOLIC DISEASE: Chronic | Status: ACTIVE | Noted: 2025-01-08

## 2025-01-24 PROBLEM — J84.9 INTERSTITIAL PULMONARY DISEASE, UNSPECIFIED: Chronic | Status: ACTIVE | Noted: 2025-01-08

## 2025-02-10 ENCOUNTER — APPOINTMENT (OUTPATIENT)
Dept: ELECTROPHYSIOLOGY | Facility: CLINIC | Age: 65
End: 2025-02-10
Payer: MEDICARE

## 2025-02-10 ENCOUNTER — NON-APPOINTMENT (OUTPATIENT)
Age: 65
End: 2025-02-10

## 2025-02-10 PROCEDURE — 93296 REM INTERROG EVL PM/IDS: CPT

## 2025-02-10 PROCEDURE — 93295 DEV INTERROG REMOTE 1/2/MLT: CPT

## 2025-02-19 ENCOUNTER — OUTPATIENT (OUTPATIENT)
Dept: OUTPATIENT SERVICES | Facility: HOSPITAL | Age: 65
LOS: 1 days | End: 2025-02-19
Payer: COMMERCIAL

## 2025-02-19 VITALS
DIASTOLIC BLOOD PRESSURE: 85 MMHG | HEIGHT: 71.5 IN | SYSTOLIC BLOOD PRESSURE: 124 MMHG | HEART RATE: 96 BPM | OXYGEN SATURATION: 96 % | RESPIRATION RATE: 20 BRPM | TEMPERATURE: 98 F | WEIGHT: 270.07 LBS

## 2025-02-19 DIAGNOSIS — Z96.649 PRESENCE OF UNSPECIFIED ARTIFICIAL HIP JOINT: Chronic | ICD-10-CM

## 2025-02-19 DIAGNOSIS — Z01.818 ENCOUNTER FOR OTHER PREPROCEDURAL EXAMINATION: ICD-10-CM

## 2025-02-19 DIAGNOSIS — Z95.810 PRESENCE OF AUTOMATIC (IMPLANTABLE) CARDIAC DEFIBRILLATOR: Chronic | ICD-10-CM

## 2025-02-19 DIAGNOSIS — I48.0 PAROXYSMAL ATRIAL FIBRILLATION: ICD-10-CM

## 2025-02-19 DIAGNOSIS — Z95.828 PRESENCE OF OTHER VASCULAR IMPLANTS AND GRAFTS: Chronic | ICD-10-CM

## 2025-02-19 DIAGNOSIS — G47.33 OBSTRUCTIVE SLEEP APNEA (ADULT) (PEDIATRIC): ICD-10-CM

## 2025-02-19 DIAGNOSIS — K21.9 GASTRO-ESOPHAGEAL REFLUX DISEASE WITHOUT ESOPHAGITIS: ICD-10-CM

## 2025-02-19 DIAGNOSIS — R19.5 OTHER FECAL ABNORMALITIES: ICD-10-CM

## 2025-02-19 DIAGNOSIS — D86.0 SARCOIDOSIS OF LUNG: ICD-10-CM

## 2025-02-19 DIAGNOSIS — I50.22 CHRONIC SYSTOLIC (CONGESTIVE) HEART FAILURE: ICD-10-CM

## 2025-02-19 LAB
ALBUMIN SERPL ELPH-MCNC: 4 G/DL — SIGNIFICANT CHANGE UP (ref 3.3–5)
ALP SERPL-CCNC: 65 U/L — SIGNIFICANT CHANGE UP (ref 40–120)
ALT FLD-CCNC: 15 U/L — SIGNIFICANT CHANGE UP (ref 10–45)
ANION GAP SERPL CALC-SCNC: 15 MMOL/L — SIGNIFICANT CHANGE UP (ref 5–17)
AST SERPL-CCNC: 15 U/L — SIGNIFICANT CHANGE UP (ref 10–40)
BILIRUB SERPL-MCNC: 0.4 MG/DL — SIGNIFICANT CHANGE UP (ref 0.2–1.2)
BUN SERPL-MCNC: 23 MG/DL — SIGNIFICANT CHANGE UP (ref 7–23)
CALCIUM SERPL-MCNC: 9.1 MG/DL — SIGNIFICANT CHANGE UP (ref 8.4–10.5)
CHLORIDE SERPL-SCNC: 109 MMOL/L — HIGH (ref 96–108)
CO2 SERPL-SCNC: 20 MMOL/L — LOW (ref 22–31)
CREAT SERPL-MCNC: 1.71 MG/DL — HIGH (ref 0.5–1.3)
EGFR: 44 ML/MIN/1.73M2 — LOW
GLUCOSE SERPL-MCNC: 117 MG/DL — HIGH (ref 70–99)
HCT VFR BLD CALC: 45.7 % — SIGNIFICANT CHANGE UP (ref 39–50)
HGB BLD-MCNC: 15.7 G/DL — SIGNIFICANT CHANGE UP (ref 13–17)
MCHC RBC-ENTMCNC: 30.5 PG — SIGNIFICANT CHANGE UP (ref 27–34)
MCHC RBC-ENTMCNC: 34.4 G/DL — SIGNIFICANT CHANGE UP (ref 32–36)
MCV RBC AUTO: 88.7 FL — SIGNIFICANT CHANGE UP (ref 80–100)
NRBC BLD AUTO-RTO: 0 /100 WBCS — SIGNIFICANT CHANGE UP (ref 0–0)
PLATELET # BLD AUTO: 167 K/UL — SIGNIFICANT CHANGE UP (ref 150–400)
POTASSIUM SERPL-MCNC: 4 MMOL/L — SIGNIFICANT CHANGE UP (ref 3.5–5.3)
POTASSIUM SERPL-SCNC: 4 MMOL/L — SIGNIFICANT CHANGE UP (ref 3.5–5.3)
PROT SERPL-MCNC: 7.5 G/DL — SIGNIFICANT CHANGE UP (ref 6–8.3)
RBC # BLD: 5.15 M/UL — SIGNIFICANT CHANGE UP (ref 4.2–5.8)
RBC # FLD: 15.2 % — HIGH (ref 10.3–14.5)
SODIUM SERPL-SCNC: 144 MMOL/L — SIGNIFICANT CHANGE UP (ref 135–145)
WBC # BLD: 8.13 K/UL — SIGNIFICANT CHANGE UP (ref 3.8–10.5)
WBC # FLD AUTO: 8.13 K/UL — SIGNIFICANT CHANGE UP (ref 3.8–10.5)

## 2025-02-19 PROCEDURE — G0463: CPT

## 2025-02-19 PROCEDURE — 85027 COMPLETE CBC AUTOMATED: CPT

## 2025-02-19 PROCEDURE — 80053 COMPREHEN METABOLIC PANEL: CPT

## 2025-02-19 NOTE — H&P PST ADULT - HISTORY OF PRESENT ILLNESS
64 year old male     64  year old male with history of epilepsy (last seizure in 2014, off meds), HLD, pulmonary sarcoidosis/fibrosis (was prescribed O2 once 3 years ago, never used), GARRETT on C-PAP, THR in 12/2020 c/b PE and pneumonia, and A-fib on Eliquis, heart failure EF 20 to 30% status post cardioversion 2021 and AICD (2021), DOUG s/p CHF exacerbation in 2020, was on hemodialysis until 4/2021,CKD with HF , who presents for evaluation of positive FIT (fecal immunochemical test), denies any GI symptoms. Patient is scheduled for EGD/Colonoscopy on 01/17/25.    64 year old male with h/o HFrEF s/p AICD placement 7/21/2021 (Medtronic [id number], last interrogation 2/10/2025), last CHF exacerbation 2020,  EF 20-30% s/p cardioversion 2021, Afib on Eliquis, pulmonary Sarcoidosis dx age 14 (was on oxygen once 3 years ago, never used since), GARRETT (CPAP), h/o seizure in Elementary and one episode in 2014 (not on any antiseizure medication), hyperlipidemia, PE/ pneumonia post THR on 12/2020      DOUG s/p CHF exacerbation in 2020, was on hemodialysis until 4/2021,CKD with HF , who presents for evaluation of positive FIT (fecal immunochemical test), denies any GI symptoms. Patient is scheduled for EGD/Colonoscopy on 01/17/25.    64 year old male with h/o HFrEF s/p AICD placement 7/21/2021 (Medtronic [id number], last interrogation 2/10/2025), last CHF exacerbation 2020,  EF 20-25%, s/p cardioversion 2021, Afib on Eliquis, pulmonary Sarcoidosis stage 4 (dx age 14, was on oxygen  3 years ago, never used since), GARRETT (CPAP), h/o seizure in Elementary and one episode in 2014  (no recurrence, not on any antiseizure medication), hyperlipidemia, PE/ pneumonia post THR on 12/2020, DOUG in 2020 was HD via perma cath until 4/2021, former smoker (quit 11/2020), GERD, anemia,  presents to [facility] for scheduled EGD/ colonoscopy for positive fecal immunochemical test on 2/26/2025. Patient was scheduled for aforementioned procedure back on 1/17/2025 and cancelled because didn't do a bowel prep.    *** Last dose of Eliquis on 2/23/2025  ***Last dose of Farxiga on 2/22/2025

## 2025-02-19 NOTE — H&P PST ADULT - PROBLEM SELECTOR PLAN 1
Scheduled for EDG/ colonoscopy  patient instructed to follow bowel prep per GI doctor, verbalized understanding  preop instruction provided with good understanding

## 2025-02-19 NOTE — H&P PST ADULT - OTHER CARE PROVIDERS
Cardiologist: CYNTHIA                Pulmonologist: CYNTHIA # [phone] Cardiologist: [facility]                Pulmonologist: [facility] pulmonary clinic  # [phone]

## 2025-02-19 NOTE — H&P PST ADULT - NS MD HP INPLANTS MED DEV
Automatic Implantable Cardioverter Defibrillator AICD- medtronic- [id number]/Automatic Implantable Cardioverter Defibrillator

## 2025-02-19 NOTE — H&P PST ADULT - ASSESSMENT
Dasi activities: 1 flight u Dasi activities: able to walk up 1 flight stair, walks from parking garage to pst without stopping   Dasi score: 4.61  patient  Dasi activities: able to walk up 1 flight stair, walks from parking garage to pst without stopping   Dasi score: 4.61  patient with partial dentures (does not wear), denies any loose or removable teeth

## 2025-03-03 ENCOUNTER — TRANSCRIPTION ENCOUNTER (OUTPATIENT)
Age: 65
End: 2025-03-03

## 2025-03-03 ENCOUNTER — RESULT REVIEW (OUTPATIENT)
Age: 65
End: 2025-03-03

## 2025-03-03 ENCOUNTER — APPOINTMENT (OUTPATIENT)
Dept: GASTROENTEROLOGY | Facility: HOSPITAL | Age: 65
End: 2025-03-03

## 2025-03-03 ENCOUNTER — OUTPATIENT (OUTPATIENT)
Dept: OUTPATIENT SERVICES | Facility: HOSPITAL | Age: 65
LOS: 1 days | End: 2025-03-03
Payer: COMMERCIAL

## 2025-03-03 VITALS
RESPIRATION RATE: 16 BRPM | SYSTOLIC BLOOD PRESSURE: 130 MMHG | TEMPERATURE: 97 F | HEART RATE: 76 BPM | HEIGHT: 72 IN | WEIGHT: 268.08 LBS | OXYGEN SATURATION: 94 % | DIASTOLIC BLOOD PRESSURE: 77 MMHG

## 2025-03-03 VITALS
OXYGEN SATURATION: 99 % | HEART RATE: 67 BPM | RESPIRATION RATE: 16 BRPM | SYSTOLIC BLOOD PRESSURE: 142 MMHG | DIASTOLIC BLOOD PRESSURE: 74 MMHG

## 2025-03-03 DIAGNOSIS — K21.9 GASTRO-ESOPHAGEAL REFLUX DISEASE WITHOUT ESOPHAGITIS: ICD-10-CM

## 2025-03-03 DIAGNOSIS — Z96.649 PRESENCE OF UNSPECIFIED ARTIFICIAL HIP JOINT: Chronic | ICD-10-CM

## 2025-03-03 DIAGNOSIS — R19.5 OTHER FECAL ABNORMALITIES: ICD-10-CM

## 2025-03-03 DIAGNOSIS — Z95.810 PRESENCE OF AUTOMATIC (IMPLANTABLE) CARDIAC DEFIBRILLATOR: Chronic | ICD-10-CM

## 2025-03-03 DIAGNOSIS — Z95.828 PRESENCE OF OTHER VASCULAR IMPLANTS AND GRAFTS: Chronic | ICD-10-CM

## 2025-03-03 PROCEDURE — 88342 IMHCHEM/IMCYTCHM 1ST ANTB: CPT | Mod: 26

## 2025-03-03 PROCEDURE — 45385 COLONOSCOPY W/LESION REMOVAL: CPT

## 2025-03-03 PROCEDURE — 45380 COLONOSCOPY AND BIOPSY: CPT | Mod: XS

## 2025-03-03 PROCEDURE — 88312 SPECIAL STAINS GROUP 1: CPT | Mod: 26

## 2025-03-03 PROCEDURE — 88341 IMHCHEM/IMCYTCHM EA ADD ANTB: CPT

## 2025-03-03 PROCEDURE — 88341 IMHCHEM/IMCYTCHM EA ADD ANTB: CPT | Mod: 26

## 2025-03-03 PROCEDURE — 43239 EGD BIOPSY SINGLE/MULTIPLE: CPT

## 2025-03-03 PROCEDURE — C1889: CPT

## 2025-03-03 PROCEDURE — 88312 SPECIAL STAINS GROUP 1: CPT

## 2025-03-03 PROCEDURE — 88305 TISSUE EXAM BY PATHOLOGIST: CPT

## 2025-03-03 PROCEDURE — 88305 TISSUE EXAM BY PATHOLOGIST: CPT | Mod: 26

## 2025-03-03 PROCEDURE — 88342 IMHCHEM/IMCYTCHM 1ST ANTB: CPT

## 2025-03-03 PROCEDURE — 45380 COLONOSCOPY AND BIOPSY: CPT | Mod: 59

## 2025-03-03 DEVICE — NET RETRV ROT ROTH 2.5MMX230CM: Type: IMPLANTABLE DEVICE | Status: FUNCTIONAL

## 2025-03-03 DEVICE — CLIP RESOLUTION 360 235CM: Type: IMPLANTABLE DEVICE | Status: FUNCTIONAL

## 2025-03-03 RX ADMIN — Medication 30 MILLILITER(S): at 13:14

## 2025-03-03 NOTE — PRE PROCEDURE NOTE - PRE PROCEDURE EVALUATION
Attending Physician: Abdi Thomas MD    Procedure: EGD/colonoscopy    Indication for Procedure: +FIT, reflux  ________________________________________________________  PAST MEDICAL & SURGICAL HISTORY:  History of epilepsy  last seizure 2014      HTN (hypertension)      HLD (hyperlipidemia)      Sarcoidosis  pulmonary dx age 14      DOUG (acute kidney injury)  ESRD - last dialysis 3/2024      Pulmonary embolism  12/2020      H/O CHF  exacerbation 12/2020      Atrial fibrillation and flutter  12/2020      Depression      GARRETT on CPAP      Anemia      H/O iron deficiency      CKD (chronic kidney disease)      Tortuous aorta      Interstitial lung disease      Dyspnea on exertion      Hyperlipidemia      History of total hip replacement  left 11/2020, and right 3/2010      S/P dialysis catheter insertion  Right chest, removed early 3/2021      Presence of automatic cardioverter/defibrillator (AICD)        ALLERGIES:  penicillin (Rash)    HOME MEDICATIONS:  atorvastatin 40 mg oral tablet: 1 tab(s) orally once a day (at bedtime)  carvedilol 12.5 mg oral tablet: 1 tab(s) orally 2 times a day  digoxin 125 mcg (0.125 mg) oral tablet: 1 tab(s) orally every other day   Eliquis 5 mg oral tablet: 1 tab(s) orally 2 times a day  Entresto 24 mg-26 mg oral tablet: 1 tab(s) orally 2 times a day  Farxiga 10 mg oral tablet: 1 tab(s) orally once a day  predniSONE 5 mg oral tablet: 1 tab(s) orally once a day  sertraline 100 mg oral tablet: 1 tab(s) orally once a day  spironolactone 25 mg oral tablet: 1 tab(s) orally once a day  torsemide 20 mg oral tablet: 1 tab(s) orally once a day  Vitamin D3 2000 intl units (50 mcg) oral tablet: 1 tab(s) orally once a day    AICD/PPM: [x ] yes   [ ] no    PERTINENT LAB DATA:                      PHYSICAL EXAMINATION:    T(C): --  HR: --  BP: --  RR: --  SpO2: --    Constitutional: NAD  HEENT: PERRLA, EOMI,    Neck:  No JVD  Respiratory: CTAB/L  Cardiovascular: S1 and S2  Gastrointestinal: BS+, soft, NT/ND  Extremities: No peripheral edema  Neurological: A/O x 3, no focal deficits  Psychiatric: Normal mood, normal affect  Skin: No rashes    ASA Class: I [ ]  II [ ]  III [x ]  IV [ ]    COMMENTS:    The patient is a suitable candidate for the planned procedure unless box checked [ ]  No, explain:

## 2025-03-03 NOTE — ASU DISCHARGE PLAN (ADULT/PEDIATRIC) - FINANCIAL ASSISTANCE
[facility] provides services at a reduced cost to those who are determined to be eligible through [facility]’s financial assistance program. Information regarding [facility]’s financial assistance program can be found by going to [email or web address] or by calling [phone].

## 2025-03-03 NOTE — ASU DISCHARGE PLAN (ADULT/PEDIATRIC) - NS MD DC FALL RISK RISK
For information on Fall & Injury Prevention, visit: [email or web address] OR  [email or web address] OR  https://www.cdc.gov/steadi/patient.html

## 2025-03-03 NOTE — ASU PATIENT PROFILE, ADULT - NSICDXPASTMEDICALHX_GEN_ALL_CORE_FT
PAST MEDICAL HISTORY:  DOUG (acute kidney injury) ESRD - last dialysis 3/2024    Anemia     Atrial fibrillation and flutter 12/2020    CKD (chronic kidney disease)     Depression     Dyspnea on exertion     H/O CHF exacerbation 12/2020    H/O iron deficiency     History of epilepsy last seizure 2014    HLD (hyperlipidemia)     HTN (hypertension)     Hyperlipidemia     Interstitial lung disease     GARRETT on CPAP     Pulmonary embolism 12/2020    Sarcoidosis pulmonary dx age 14    Tortuous aorta      none

## 2025-03-05 LAB — SURGICAL PATHOLOGY STUDY: SIGNIFICANT CHANGE UP

## 2025-03-13 ENCOUNTER — NON-APPOINTMENT (OUTPATIENT)
Age: 65
End: 2025-03-13

## 2025-04-08 ENCOUNTER — NON-APPOINTMENT (OUTPATIENT)
Age: 65
End: 2025-04-08

## 2025-05-01 ENCOUNTER — APPOINTMENT (OUTPATIENT)
Dept: HOME HEALTH SERVICES | Facility: HOME HEALTH | Age: 65
End: 2025-05-01

## 2025-05-02 ENCOUNTER — NON-APPOINTMENT (OUTPATIENT)
Age: 65
End: 2025-05-02

## 2025-05-12 ENCOUNTER — APPOINTMENT (OUTPATIENT)
Dept: ELECTROPHYSIOLOGY | Facility: CLINIC | Age: 65
End: 2025-05-12
Payer: MEDICARE

## 2025-05-12 ENCOUNTER — NON-APPOINTMENT (OUTPATIENT)
Age: 65
End: 2025-05-12

## 2025-05-12 PROCEDURE — 93296 REM INTERROG EVL PM/IDS: CPT

## 2025-05-12 PROCEDURE — 93295 DEV INTERROG REMOTE 1/2/MLT: CPT

## 2025-05-15 NOTE — ASU PREOP CHECKLIST - NSWEIGHTCALCTOOLDRUG_GEN_A_CORE
Please address Risperidone refill due to medication protocol. Last seen by PCP 3/31/35, last refill in epic 4/29/25 and behavioral health appt 6/26/25.     used

## 2025-06-02 ENCOUNTER — NON-APPOINTMENT (OUTPATIENT)
Age: 65
End: 2025-06-02

## 2025-08-15 ENCOUNTER — NON-APPOINTMENT (OUTPATIENT)
Age: 65
End: 2025-08-15

## 2025-08-15 ENCOUNTER — APPOINTMENT (OUTPATIENT)
Dept: ELECTROPHYSIOLOGY | Facility: CLINIC | Age: 65
End: 2025-08-15
Payer: MEDICARE

## 2025-08-15 PROCEDURE — 93296 REM INTERROG EVL PM/IDS: CPT

## 2025-08-15 PROCEDURE — 93295 DEV INTERROG REMOTE 1/2/MLT: CPT

## (undated) DEVICE — TUBING SUCTION CONN 6FT STERILE

## (undated) DEVICE — POLY TRAP ETRAP

## (undated) DEVICE — SUCTION YANKAUER NO CONTROL VENT

## (undated) DEVICE — SOL INJ NS 0.9% 500ML 2 PORT

## (undated) DEVICE — TUBING IV SET GRAVITY 3Y 100" MACRO

## (undated) DEVICE — SYR LUER LOK 50CC

## (undated) DEVICE — SYR ALLIANCE II INFLATION 60ML

## (undated) DEVICE — BITE BLOCK ADULT 20 X 27MM (GREEN)

## (undated) DEVICE — FORCEP RADIAL JAW 4 JUMBO 2.8MM 3.2MM 240CM ORANGE DISP

## (undated) DEVICE — BRUSH COLONOSCOPY CYTOLOGY

## (undated) DEVICE — ELCTR GROUNDING PAD ADULT COVIDIEN

## (undated) DEVICE — PACK IV START WITH CHG

## (undated) DEVICE — SNARE CAPTIVATOR RND COLD STIFF 2.4X10MM 240CM

## (undated) DEVICE — BALLOON US ENDO

## (undated) DEVICE — CATH IV SAFE BC 22G X 1" (BLUE)

## (undated) DEVICE — CATH IV SAFE BC 20G X 1.16" (PINK)

## (undated) DEVICE — ENDOCUFF VISION SZ 3 SM PRPL

## (undated) DEVICE — TUBING SUCTION 20FT

## (undated) DEVICE — SENSOR O2 FINGER ADULT

## (undated) DEVICE — BIOPSY FORCEP RADIAL JAW 4 STANDARD WITH NEEDLE

## (undated) DEVICE — CLAMP BX HOT RAD JAW 3

## (undated) DEVICE — IRRIGATOR BIO SHIELD

## (undated) DEVICE — FOLEY HOLDER STATLOCK 2 WAY ADULT